# Patient Record
Sex: MALE | Race: WHITE | NOT HISPANIC OR LATINO | Employment: FULL TIME | URBAN - METROPOLITAN AREA
[De-identification: names, ages, dates, MRNs, and addresses within clinical notes are randomized per-mention and may not be internally consistent; named-entity substitution may affect disease eponyms.]

---

## 2017-01-31 RX ORDER — PANTOPRAZOLE SODIUM 40 MG/1
TABLET, DELAYED RELEASE ORAL
Qty: 60 TABLET | Refills: 10 | OUTPATIENT
Start: 2017-01-31

## 2017-03-07 ENCOUNTER — LAB (OUTPATIENT)
Dept: LAB | Facility: HOSPITAL | Age: 62
End: 2017-03-07

## 2017-03-07 ENCOUNTER — TELEPHONE (OUTPATIENT)
Dept: CARDIOLOGY | Facility: CLINIC | Age: 62
End: 2017-03-07

## 2017-03-07 ENCOUNTER — OFFICE VISIT (OUTPATIENT)
Dept: CARDIOLOGY | Facility: CLINIC | Age: 62
End: 2017-03-07

## 2017-03-07 VITALS
HEIGHT: 69 IN | WEIGHT: 195.02 LBS | SYSTOLIC BLOOD PRESSURE: 120 MMHG | HEART RATE: 65 BPM | DIASTOLIC BLOOD PRESSURE: 88 MMHG | BODY MASS INDEX: 28.88 KG/M2

## 2017-03-07 DIAGNOSIS — I25.810 CORONARY ARTERY DISEASE INVOLVING CORONARY BYPASS GRAFT OF NATIVE HEART WITHOUT ANGINA PECTORIS: ICD-10-CM

## 2017-03-07 DIAGNOSIS — E78.49 OTHER HYPERLIPIDEMIA: ICD-10-CM

## 2017-03-07 DIAGNOSIS — R79.89 ABNORMAL LFTS: ICD-10-CM

## 2017-03-07 DIAGNOSIS — E78.49 OTHER HYPERLIPIDEMIA: Primary | ICD-10-CM

## 2017-03-07 LAB
ALBUMIN SERPL-MCNC: 4.5 G/DL (ref 3.5–5.2)
ALBUMIN/GLOB SERPL: 1.6 G/DL
ALP SERPL-CCNC: 73 U/L (ref 39–117)
ALT SERPL W P-5'-P-CCNC: 46 U/L (ref 1–41)
ANION GAP SERPL CALCULATED.3IONS-SCNC: 15.9 MMOL/L
AST SERPL-CCNC: 34 U/L (ref 1–40)
BILIRUB SERPL-MCNC: 0.5 MG/DL (ref 0.1–1.2)
BUN BLD-MCNC: 11 MG/DL (ref 8–23)
BUN/CREAT SERPL: 11.6 (ref 7–25)
CALCIUM SPEC-SCNC: 9.6 MG/DL (ref 8.6–10.5)
CHLORIDE SERPL-SCNC: 101 MMOL/L (ref 98–107)
CHOLEST SERPL-MCNC: 159 MG/DL (ref 0–200)
CO2 SERPL-SCNC: 27.1 MMOL/L (ref 22–29)
CREAT BLD-MCNC: 0.95 MG/DL (ref 0.76–1.27)
GFR SERPL CREATININE-BSD FRML MDRD: 81 ML/MIN/1.73
GGT SERPL-CCNC: 536 U/L (ref 8–61)
GLOBULIN UR ELPH-MCNC: 2.8 GM/DL
GLUCOSE BLD-MCNC: 103 MG/DL (ref 65–99)
HDLC SERPL-MCNC: 57 MG/DL (ref 40–60)
LDLC SERPL CALC-MCNC: 87 MG/DL (ref 0–100)
LDLC/HDLC SERPL: 1.53 {RATIO}
POTASSIUM BLD-SCNC: 4.7 MMOL/L (ref 3.5–5.2)
PROT SERPL-MCNC: 7.3 G/DL (ref 6–8.5)
SODIUM BLD-SCNC: 144 MMOL/L (ref 136–145)
TRIGL SERPL-MCNC: 75 MG/DL (ref 0–150)
VLDLC SERPL-MCNC: 15 MG/DL (ref 5–40)

## 2017-03-07 PROCEDURE — 36415 COLL VENOUS BLD VENIPUNCTURE: CPT | Performed by: INTERNAL MEDICINE

## 2017-03-07 PROCEDURE — 80053 COMPREHEN METABOLIC PANEL: CPT | Performed by: INTERNAL MEDICINE

## 2017-03-07 PROCEDURE — 80061 LIPID PANEL: CPT | Performed by: INTERNAL MEDICINE

## 2017-03-07 PROCEDURE — 82977 ASSAY OF GGT: CPT

## 2017-03-07 PROCEDURE — 93000 ELECTROCARDIOGRAM COMPLETE: CPT | Performed by: INTERNAL MEDICINE

## 2017-03-07 PROCEDURE — 99214 OFFICE O/P EST MOD 30 MIN: CPT | Performed by: INTERNAL MEDICINE

## 2017-03-07 NOTE — TELEPHONE ENCOUNTER
----- Message from Torsten Lwa MD sent at 3/7/2017  3:01 PM EST -----  i called and left a message; nl lfts look good ggt is no change it is high; lipif=ds are better

## 2017-03-07 NOTE — PROGRESS NOTES
Date of Office Visit: 2017  Encounter Provider: Torsten Law MD  Place of Service: [facility] CARDIOLOGY  Patient Name: Khai Slater  :1955  [id number]    Chief Complaint   Patient presents with   • Coronary Artery Disease   :     HPI: Khai Slater is a 62 y.o. male   He is here for a followup of his coronary disease. He has been doing well. He has not had chest pain, shortness of breath.  He works out on the [employer] three times a week.  He does not have PND, orthopnea, edema, syncope, palpitations.           Past Medical History   Diagnosis Date   • Acute non-ST-elevation myocardial infarction 3/4/2016     Description:  tx with Taxus stent to dis circ   • CAD (coronary artery disease)    • Coronary arteriosclerosis in native artery 3/4/2016     Description: due to FAA cath in  restenosis in circ and new lesion in LAD tx with cypher stents;  no sx but due to FAA relooked and CABG LIMA to LAD and SVG to circ by Dr Goode;  recath no sx and LIMA atretic and SVG to circ gone   • GERD (gastroesophageal reflux disease)    • Hyperlipidemia    • Hypertension    • NSTEMI (non-ST elevated myocardial infarction)        Past Surgical History   Procedure Laterality Date   • Coronary artery bypass graft     • Coronary angioplasty with stent placement       2013; 70% stenosis in the LIMA to the LAD, % occluded, proximal LAD has 90% in stent stenosis, 100% distal left circumflex, 20% diffuse disease of the RCA.   • Colonoscopy         Social History     Social History   • Marital status:      Spouse name: N/A   • Number of children: N/A   • Years of education: N/A     Occupational History   • Not on file.     Social History Main Topics   • Smoking status: Never Smoker   • Smokeless tobacco: Not on file      Comment: CAFFEINE USE   • Alcohol use Yes      Comment: occassionally   • Drug use: No   • Sexual activity: Defer     Other Topics Concern  "  • Not on file     Social History Narrative       Family History   Problem Relation Age of Onset   • Heart disease Mother    • Heart attack Mother    • Hypertension Father    • Diabetes Maternal Grandfather        Review of Systems   Constitution: Negative for decreased appetite, fever, malaise/fatigue and weight loss.   HENT: Negative for nosebleeds.    Eyes: Negative for double vision.   Cardiovascular: Negative for chest pain, claudication, cyanosis, dyspnea on exertion, irregular heartbeat, leg swelling, near-syncope, orthopnea, palpitations, paroxysmal nocturnal dyspnea and syncope.   Respiratory: Negative for cough, hemoptysis and shortness of breath.    Hematologic/Lymphatic: Negative for bleeding problem.   Skin: Negative for rash.   Musculoskeletal: Negative for falls and myalgias.   Gastrointestinal: Negative for hematochezia, jaundice, melena, nausea and vomiting.   Genitourinary: Negative for hematuria.   Neurological: Negative for dizziness and seizures.   Psychiatric/Behavioral: Negative for altered mental status and memory loss.       No Known Allergies      Current Outpatient Prescriptions:   •  aspirin 81 MG tablet, Take by mouth., Disp: , Rfl:   •  atorvastatin (LIPITOR) 40 MG tablet, TAKE ONE TABLET BY MOUTH DAILY, Disp: 90 tablet, Rfl: 2  •  carvedilol (COREG) 6.25 MG tablet, TAKE ONE TABLET BY MOUTH TWICE A DAY, Disp: 180 tablet, Rfl: 2  •  clopidogrel (PLAVIX) 75 MG tablet, TAKE ONE TABLET BY MOUTH DAILY, Disp: 90 tablet, Rfl: 2  •  Coenzyme Q10 (CO Q 10 PO), Take by mouth., Disp: , Rfl:   •  lisinopril (PRINIVIL,ZESTRIL) 5 MG tablet, TAKE ONE TABLET BY MOUTH DAILY, Disp: 90 tablet, Rfl: 2  •  pantoprazole (PROTONIX) 40 MG EC tablet, Take by mouth., Disp: , Rfl:      Objective:     Vitals:    03/07/17 1127   BP: 120/88   Pulse: 65   Weight: 195 lb 0.3 oz (88.5 kg)   Height: 69\" (175.3 cm)     Body mass index is 28.8 kg/(m^2).    Physical Exam   Constitutional: He is oriented to person, place, " and time. He appears well-developed and well-nourished.   HENT:   Head: Normocephalic.   Eyes: No scleral icterus.   Neck: No JVD present. No thyromegaly present.   Cardiovascular: Normal rate, regular rhythm and normal heart sounds.  Exam reveals no gallop and no friction rub.    No murmur heard.  Pulmonary/Chest: Effort normal and breath sounds normal. He has no wheezes. He has no rales.   Abdominal: Soft. There is no hepatosplenomegaly. There is no tenderness.   Musculoskeletal: Normal range of motion. He exhibits no edema.   Lymphadenopathy:     He has no cervical adenopathy.   Neurological: He is alert and oriented to person, place, and time.   Skin: Skin is warm and dry. No rash noted.   Psychiatric: He has a normal mood and affect.         ECG 12 Lead  Date/Time: 3/7/2017 12:38 PM  Performed by: JULIANNA POWERS  Authorized by: JULIANNA POWERS   Comparison: compared with previous ECG   Similar to previous ECG  Rhythm: sinus rhythm  Clinical impression: normal ECG             Assessment:       Diagnosis Plan   1. Other hyperlipidemia  Comprehensive Metabolic Panel    Lipid Panel    Gamma GT   2. Abnormal LFTs  Comprehensive Metabolic Panel    Lipid Panel    Gamma GT   3. Coronary artery disease involving coronary bypass graft of native heart without angina pectoris  Comprehensive Metabolic Panel    Lipid Panel    Gamma GT          Plan:        He is doing well. He is asymptomatic.   His story is a little tough. He had a non-STEMI in 2007.  To his distal circ he had a Taxus stent put in.   In 2008, as part of his FAA evaluation it looked like he had a problem.  He had a repeat cath and his circ had restenosed, and they felt he had a new lesion in his LAD.  He had Cypher stents at that time.  In 2012, again he had to have a repeat cath because of his FAA status.  They felt that those had all re-stenosed and so he underwent a bypass.  He had a LIMA to the LAD, and a vein to the circ.  The RCA has always been okay.    In 2013, recathed, no symptoms.   The LIMA they felt was somewhat smallish and may have a lesion at the insertion site, the vein to the circumflex was occluded, the RCA had some 20% disease in it.  We have managed him medically since then.  In 2015 he had a stress test that was normal.  Honestly, when I have looked at his films I feel like that DAMEON to the LAD is probably okay and does not have significant disease in it.  I think that he has a small area of his distal circ that is a problem, but that does not even show up on his stress.  His right coronaries are okay, and his LAD is okay.  He has been fine.  At this point, I am not going to make any changes. I am going to check his liver functions because those have been high, and we have him on statins. I am going to have him come back in and see me in six months.  Next year we will plan on doing a stress test on him.       Coronary Artery Disease  Assessment  • The patient has no angina    Plan  • Lifestyle modifications discussed include adhering to a heart healthy diet, maintenance of a healthy weight, medication compliance, regular exercise and regular monitoring of cholesterol and blood pressure    Subjective - Objective  • There is a history of past MI  • There is a history of previous coronary artery bypass graft  • There has been a previous stent procedure using MC  • Current antiplatelet therapy includes aspirin 81 mg        As always, it has been a pleasure to participate in your patient's care.      Sincerely,       Torsten Law MD

## 2017-04-05 RX ORDER — CLOPIDOGREL BISULFATE 75 MG/1
TABLET ORAL
Qty: 90 TABLET | Refills: 1 | Status: SHIPPED | OUTPATIENT
Start: 2017-04-05 | End: 2017-10-23 | Stop reason: SDUPTHER

## 2017-09-07 ENCOUNTER — LAB (OUTPATIENT)
Dept: LAB | Facility: HOSPITAL | Age: 62
End: 2017-09-07

## 2017-09-07 ENCOUNTER — TELEPHONE (OUTPATIENT)
Dept: CARDIOLOGY | Facility: CLINIC | Age: 62
End: 2017-09-07

## 2017-09-07 ENCOUNTER — OFFICE VISIT (OUTPATIENT)
Dept: CARDIOLOGY | Facility: CLINIC | Age: 62
End: 2017-09-07

## 2017-09-07 VITALS
HEIGHT: 69 IN | OXYGEN SATURATION: 99 % | WEIGHT: 190 LBS | BODY MASS INDEX: 28.14 KG/M2 | SYSTOLIC BLOOD PRESSURE: 120 MMHG | DIASTOLIC BLOOD PRESSURE: 78 MMHG | HEART RATE: 60 BPM

## 2017-09-07 DIAGNOSIS — I25.810 CORONARY ARTERY DISEASE INVOLVING CORONARY BYPASS GRAFT OF NATIVE HEART WITHOUT ANGINA PECTORIS: Primary | ICD-10-CM

## 2017-09-07 DIAGNOSIS — R79.89 ABNORMAL LFTS: ICD-10-CM

## 2017-09-07 LAB
ALBUMIN SERPL-MCNC: 4.5 G/DL (ref 3.5–5.2)
ALBUMIN/GLOB SERPL: 1.6 G/DL
ALP SERPL-CCNC: 74 U/L (ref 39–117)
ALT SERPL W P-5'-P-CCNC: 40 U/L (ref 1–41)
ANION GAP SERPL CALCULATED.3IONS-SCNC: 10.2 MMOL/L
AST SERPL-CCNC: 28 U/L (ref 1–40)
BILIRUB SERPL-MCNC: 0.8 MG/DL (ref 0.1–1.2)
BUN BLD-MCNC: 12 MG/DL (ref 8–23)
BUN/CREAT SERPL: 12.1 (ref 7–25)
CALCIUM SPEC-SCNC: 10 MG/DL (ref 8.6–10.5)
CHLORIDE SERPL-SCNC: 100 MMOL/L (ref 98–107)
CO2 SERPL-SCNC: 28.8 MMOL/L (ref 22–29)
CREAT BLD-MCNC: 0.99 MG/DL (ref 0.76–1.27)
GFR SERPL CREATININE-BSD FRML MDRD: 77 ML/MIN/1.73
GGT SERPL-CCNC: 467 U/L (ref 8–61)
GLOBULIN UR ELPH-MCNC: 2.9 GM/DL
GLUCOSE BLD-MCNC: 105 MG/DL (ref 65–99)
POTASSIUM BLD-SCNC: 5.2 MMOL/L (ref 3.5–5.2)
PROT SERPL-MCNC: 7.4 G/DL (ref 6–8.5)
SODIUM BLD-SCNC: 139 MMOL/L (ref 136–145)

## 2017-09-07 PROCEDURE — 82977 ASSAY OF GGT: CPT

## 2017-09-07 PROCEDURE — 36415 COLL VENOUS BLD VENIPUNCTURE: CPT

## 2017-09-07 PROCEDURE — 80053 COMPREHEN METABOLIC PANEL: CPT

## 2017-09-07 PROCEDURE — 93000 ELECTROCARDIOGRAM COMPLETE: CPT | Performed by: PHYSICIAN ASSISTANT

## 2017-09-07 PROCEDURE — 99214 OFFICE O/P EST MOD 30 MIN: CPT | Performed by: PHYSICIAN ASSISTANT

## 2017-09-07 NOTE — PROGRESS NOTES
Date of Office Visit: 2017  Encounter Provider: DENISSE Livingston  Place of Service: [facility] CARDIOLOGY  Patient Name: Khai Slater  :1955    Chief Complaint   Patient presents with   • Coronary Artery Disease     6 month follow up   :     HPI: Khai Slater is a 62 y.o. male who presents today for Follow-up.  Old records have been obtained and reviewed by me.  He is a patient of Dr. Law's with a past cardiac history significant for coronary artery disease, status post CABG and multiple stent placements.  His last stress test was in 2015, and it was normal.  He has been managed medically for his coronary disease since then.  He was seen in our office by Dr. Law on 3/4/2016.  At that time, he reported that he was feeling well.  He denied any chest pain or shortness of breath, and was exercising often.  Dr. Law noted that recent lab work revealed elevated liver enzymes.  Lab work was rechecked on 2016.  This included a CMP, lipid panel, and gamma GT.  CMP at that time revealed normal liver enzymes.  His gamma GT was elevated at 523.  At the time that these labs were drawn, the patient stated that he had accidentally been off his Lipitor for 1 week.    I first saw him about a year ago and he was doing great.  He had been taking his Lipitor daily.  He then saw Dr. Law on 3/7/2017 and was still doing well.  Dr. Law checked his liver function tests, which showed a stable liver enzymes and a stable but elevated gamma GT.  His lipid panel looks fantastic with an LDL of 87 and HDL of 57.  No changes were made to his medical regimen, and he is here today for 6 month follow-up.  The plan was to check a stress test in 1 year, which would be in 2018.   Over the past 6 months he's been doing well.  He is exercising regularly.  He and his wife went for a bike ride for 14 miles the other day and had no problem whatsoever.  He denies any chest  pain, palpitations, shortness of breath, edema, dizziness, or syncope.  He did the Whole 30 diet and lost 14 pounds and felt great.        Past Medical History:   Diagnosis Date   • Acute non-ST-elevation myocardial infarction 3/4/2016    Description: 7/07 tx with Taxus stent to dis circ   • CAD (coronary artery disease)    • Coronary arteriosclerosis in native artery 3/4/2016    Description: due to FAA cath in 1/08 restenosis in circ and new lesion in LAD tx with cypher stents; 9/12 no sx but due to FAA relooked and CABG LIMA to LAD and SVG to circ by Dr Goode; 5/13 recath no sx and LIMA atretic and SVG to circ gone   • GERD (gastroesophageal reflux disease)    • Hyperlipidemia    • Hypertension    • NSTEMI (non-ST elevated myocardial infarction)        Past Surgical History:   Procedure Laterality Date   • COLONOSCOPY     • CORONARY ANGIOPLASTY WITH STENT PLACEMENT      5/2/2013; 70% stenosis in the LIMA to the LAD, % occluded, proximal LAD has 90% in stent stenosis, 100% distal left circumflex, 20% diffuse disease of the RCA.   • CORONARY ARTERY BYPASS GRAFT         Social History     Social History   • Marital status:      Spouse name: N/A   • Number of children: N/A   • Years of education: N/A     Occupational History   • Not on file.     Social History Main Topics   • Smoking status: Never Smoker   • Smokeless tobacco: Not on file      Comment: CAFFEINE USE   • Alcohol use 0.6 oz/week     1 Cans of beer per week      Comment: occassionally   • Drug use: No   • Sexual activity: Defer     Other Topics Concern   • Not on file     Social History Narrative       Family History   Problem Relation Age of Onset   • Heart disease Mother    • Heart attack Mother    • Hypertension Father    • No Known Problems Maternal Grandmother    • Diabetes Maternal Grandfather    • No Known Problems Paternal Grandmother    • No Known Problems Paternal Grandfather        Review of Systems   Constitution: Negative for  chills, fever, malaise/fatigue, weight gain and weight loss.   HENT: Negative for ear pain, headaches, hearing loss, nosebleeds and sore throat.    Eyes: Negative for double vision, pain and visual disturbance.   Cardiovascular: Negative for chest pain, dyspnea on exertion, irregular heartbeat, leg swelling, near-syncope, orthopnea, palpitations, paroxysmal nocturnal dyspnea and syncope.   Respiratory: Negative for cough, shortness of breath, sleep disturbances due to breathing, snoring and wheezing.    Endocrine: Negative for cold intolerance, heat intolerance and polyuria.   Skin: Negative for itching and rash.   Musculoskeletal: Negative for joint pain, joint swelling and myalgias.   Gastrointestinal: Negative for abdominal pain, diarrhea, melena, nausea and vomiting.   Genitourinary: Negative for frequency, hematuria and hesitancy.   Neurological: Negative for excessive daytime sleepiness, light-headedness, numbness, paresthesias and seizures.   Psychiatric/Behavioral: Negative for altered mental status and depression.   Allergic/Immunologic: Negative.    All other systems reviewed and are negative.      No Known Allergies      Current Outpatient Prescriptions:   •  aspirin 81 MG tablet, Take 81 mg by mouth Daily., Disp: , Rfl:   •  atorvastatin (LIPITOR) 40 MG tablet, TAKE ONE TABLET BY MOUTH DAILY, Disp: 90 tablet, Rfl: 2  •  carvedilol (COREG) 6.25 MG tablet, TAKE ONE TABLET BY MOUTH TWICE A DAY, Disp: 180 tablet, Rfl: 2  •  clopidogrel (PLAVIX) 75 MG tablet, TAKE ONE TABLET BY MOUTH DAILY, Disp: 90 tablet, Rfl: 1  •  Coenzyme Q10 (CO Q 10 PO), Take 1 tablet by mouth Daily., Disp: , Rfl:   •  lisinopril (PRINIVIL,ZESTRIL) 5 MG tablet, TAKE ONE TABLET BY MOUTH DAILY, Disp: 90 tablet, Rfl: 2  •  pantoprazole (PROTONIX) 40 MG EC tablet, Take 40 mg by mouth Daily., Disp: , Rfl:      Objective:     Vitals:    09/07/17 1101 09/07/17 1117   BP: 118/76 120/78   BP Location: Right arm Left arm   Pulse: 60    SpO2: 99%  "   Weight: 190 lb (86.2 kg)    Height: 69\" (175.3 cm)      Body mass index is 28.06 kg/(m^2).    PHYSICAL EXAM:    Physical Exam   Constitutional: He is oriented to person, place, and time. He appears well-developed and well-nourished. No distress.   HENT:   Head: Normocephalic and atraumatic.   Eyes: Pupils are equal, round, and reactive to light.   Neck: No JVD present. No thyromegaly present.   Cardiovascular: Normal rate, regular rhythm, normal heart sounds and intact distal pulses.    No murmur heard.  Pulmonary/Chest: Effort normal and breath sounds normal. No respiratory distress.   Abdominal: Soft. Bowel sounds are normal. He exhibits no distension. There is no splenomegaly or hepatomegaly. There is no tenderness.   Musculoskeletal: Normal range of motion. He exhibits no edema.   Neurological: He is alert and oriented to person, place, and time.   Skin: Skin is warm and dry. He is not diaphoretic. No erythema.   Psychiatric: He has a normal mood and affect. His behavior is normal. Judgment normal.         ECG 12 Lead  Date/Time: 9/7/2017 11:24 AM  Performed by: MIKKI MARCH.  Authorized by: MIKKI MARCH.   Comparison: compared with previous ECG from 3/7/2017  Similar to previous ECG  Rhythm: sinus rhythm  BPM: 60  T depression: III  Q waves: III and aVF  Clinical impression: abnormal ECG  Comments: Indication: Coronary artery disease, status post CABG and stent placement.              Assessment:       Diagnosis Plan   1. Coronary artery disease involving coronary bypass graft of native heart without angina pectoris  Stress Test With Myocardial Perfusion One Day    ECG 12 Lead   2. Abnormal LFTs  Comprehensive Metabolic Panel    Gamma GT     Orders Placed This Encounter   Procedures   • Comprehensive Metabolic Panel     Standing Status:   Future     Standing Expiration Date:   9/7/2018   • Gamma GT     Standing Status:   Future     Standing Expiration Date:   9/7/2018   • Stress Test With Myocardial " Perfusion One Day     Same day as his appt. With Thanh in 3/2018     Standing Status:   Future     Standing Expiration Date:   9/7/2018     Order Specific Question:   Rest/stress, rest only or stress only?     Answer:   Rest/Stress     Order Specific Question:   What stress agent will be used?     Answer:   Exercise with possible pharmacologic     Order Specific Question:   Reason for exam?     Answer:   Known Coronary Artery Disease   • ECG 12 Lead     This order was created via procedure documentation          Plan:       1.  Coronary Artery Disease  Assessment  • The patient has no angina    Plan  • Lifestyle modifications discussed include adhering to a heart healthy diet, maintenance of a healthy weight, medication compliance, regular exercise and regular monitoring of cholesterol and blood pressure    Subjective - Objective  • There is a history of past MI  • There is a history of previous coronary artery bypass graft  • There has been a previous stent procedure using MC  • Current antiplatelet therapy includes aspirin 81 mg and clopidogrel 75 mg  • Overall he's doing great.  He is very active without any difficulty whatsoever.  He had his wife would like to take a trip to [place], and I think this is a great idea.  I just encouraged him to make sure that he takes his medications with him and that he stands up and walks on the plain every now and then to prevent blood clots in his legs.  Per Dr. Law's recommendations, we will check a nuclear stress test in 6 months when he comes back to see Dr. Law.    2.  Elevated LFTs.  I'm going to check a CMP and a Gamma GT today.  These will both be nonfasting.    He will follow-up with Dr. Law in 6 months with a stress test the same day.    As always, it has been a pleasure to participate in your patient's care.      Sincerely,         Kanika Herrera PA-C

## 2017-09-07 NOTE — TELEPHONE ENCOUNTER
I called the patient and informed him of his normal liver function test and a lower gamma GT.  Continue current medical regimen.

## 2017-10-04 RX ORDER — LISINOPRIL 5 MG/1
5 TABLET ORAL DAILY
Qty: 90 TABLET | Refills: 2 | Status: SHIPPED | OUTPATIENT
Start: 2017-10-04 | End: 2018-06-20 | Stop reason: SDUPTHER

## 2017-10-23 RX ORDER — CLOPIDOGREL BISULFATE 75 MG/1
TABLET ORAL
Qty: 90 TABLET | Refills: 3 | Status: SHIPPED | OUTPATIENT
Start: 2017-10-23 | End: 2018-06-20 | Stop reason: SDUPTHER

## 2018-01-08 RX ORDER — ATORVASTATIN CALCIUM 40 MG/1
TABLET, FILM COATED ORAL
Qty: 90 TABLET | Refills: 3 | Status: SHIPPED | OUTPATIENT
Start: 2018-01-08 | End: 2018-04-05

## 2018-02-13 RX ORDER — CARVEDILOL 6.25 MG/1
TABLET ORAL
Qty: 180 TABLET | Refills: 3 | Status: SHIPPED | OUTPATIENT
Start: 2018-02-13 | End: 2018-06-20 | Stop reason: SDUPTHER

## 2018-04-05 ENCOUNTER — LAB (OUTPATIENT)
Dept: LAB | Facility: HOSPITAL | Age: 63
End: 2018-04-05

## 2018-04-05 ENCOUNTER — HOSPITAL ENCOUNTER (OUTPATIENT)
Dept: CARDIOLOGY | Facility: HOSPITAL | Age: 63
Discharge: HOME | End: 2018-04-05
Admitting: PHYSICIAN ASSISTANT

## 2018-04-05 ENCOUNTER — OFFICE VISIT (OUTPATIENT)
Dept: CARDIOLOGY | Facility: CLINIC | Age: 63
End: 2018-04-05

## 2018-04-05 VITALS
HEART RATE: 72 BPM | DIASTOLIC BLOOD PRESSURE: 72 MMHG | BODY MASS INDEX: 29.03 KG/M2 | WEIGHT: 196 LBS | OXYGEN SATURATION: 98 % | HEIGHT: 69 IN | SYSTOLIC BLOOD PRESSURE: 112 MMHG

## 2018-04-05 DIAGNOSIS — I25.810 CORONARY ARTERY DISEASE INVOLVING CORONARY BYPASS GRAFT OF NATIVE HEART WITHOUT ANGINA PECTORIS: ICD-10-CM

## 2018-04-05 DIAGNOSIS — R79.89 ABNORMAL LFTS: ICD-10-CM

## 2018-04-05 DIAGNOSIS — R79.89 ABNORMAL LFTS: Primary | ICD-10-CM

## 2018-04-05 LAB
ALBUMIN SERPL-MCNC: 4.4 G/DL (ref 3.5–5.2)
ALBUMIN/GLOB SERPL: 1.8 G/DL
ALP SERPL-CCNC: 87 U/L (ref 39–117)
ALT SERPL W P-5'-P-CCNC: 59 U/L (ref 1–41)
ANION GAP SERPL CALCULATED.3IONS-SCNC: 10.1 MMOL/L
AST SERPL-CCNC: 30 U/L (ref 1–40)
BH CV NUCLEAR PRIOR STUDY: 3
BH CV STRESS BP STAGE 1: NORMAL
BH CV STRESS BP STAGE 2: NORMAL
BH CV STRESS BP STAGE 3: NORMAL
BH CV STRESS BP STAGE 4: NORMAL
BH CV STRESS DURATION MIN STAGE 1: 3
BH CV STRESS DURATION MIN STAGE 2: 3
BH CV STRESS DURATION MIN STAGE 3: 3
BH CV STRESS DURATION MIN STAGE 4: 3
BH CV STRESS DURATION SEC STAGE 1: 0
BH CV STRESS DURATION SEC STAGE 2: 0
BH CV STRESS DURATION SEC STAGE 3: 0
BH CV STRESS DURATION SEC STAGE 4: 0
BH CV STRESS GRADE STAGE 1: 10
BH CV STRESS GRADE STAGE 2: 12
BH CV STRESS GRADE STAGE 3: 14
BH CV STRESS GRADE STAGE 4: 16
BH CV STRESS HR STAGE 1: 87
BH CV STRESS HR STAGE 2: 90
BH CV STRESS HR STAGE 3: 116
BH CV STRESS HR STAGE 4: 160
BH CV STRESS METS STAGE 1: 5
BH CV STRESS METS STAGE 2: 7.5
BH CV STRESS METS STAGE 3: 10
BH CV STRESS METS STAGE 4: 13.5
BH CV STRESS PROTOCOL 1: NORMAL
BH CV STRESS RECOVERY BP: NORMAL MMHG
BH CV STRESS RECOVERY HR: 94 BPM
BH CV STRESS SPEED STAGE 1: 1.7
BH CV STRESS SPEED STAGE 2: 2.5
BH CV STRESS SPEED STAGE 3: 3.4
BH CV STRESS SPEED STAGE 4: 4.2
BH CV STRESS STAGE 1: 1
BH CV STRESS STAGE 2: 2
BH CV STRESS STAGE 3: 3
BH CV STRESS STAGE 4: 4
BILIRUB SERPL-MCNC: 0.6 MG/DL (ref 0.1–1.2)
BUN BLD-MCNC: 12 MG/DL (ref 8–23)
BUN/CREAT SERPL: 11.8 (ref 7–25)
CALCIUM SPEC-SCNC: 9.5 MG/DL (ref 8.6–10.5)
CHLORIDE SERPL-SCNC: 101 MMOL/L (ref 98–107)
CHOLEST SERPL-MCNC: 142 MG/DL (ref 0–200)
CO2 SERPL-SCNC: 29.9 MMOL/L (ref 22–29)
CREAT BLD-MCNC: 1.02 MG/DL (ref 0.76–1.27)
GFR SERPL CREATININE-BSD FRML MDRD: 74 ML/MIN/1.73
GGT SERPL-CCNC: 487 U/L (ref 8–61)
GLOBULIN UR ELPH-MCNC: 2.4 GM/DL
GLUCOSE BLD-MCNC: 139 MG/DL (ref 65–99)
HDLC SERPL-MCNC: 52 MG/DL (ref 40–60)
LDLC SERPL CALC-MCNC: 68 MG/DL (ref 0–100)
LDLC/HDLC SERPL: 1.3 {RATIO}
LV EF NUC BP: 62 %
MAXIMAL PREDICTED HEART RATE: 158 BPM
PERCENT MAX PREDICTED HR: 101.27 %
POTASSIUM BLD-SCNC: 4.5 MMOL/L (ref 3.5–5.2)
PROT SERPL-MCNC: 6.8 G/DL (ref 6–8.5)
SODIUM BLD-SCNC: 141 MMOL/L (ref 136–145)
STRESS BASELINE BP: NORMAL MMHG
STRESS BASELINE HR: 63 BPM
STRESS PERCENT HR: 119 %
STRESS POST ESTIMATED WORKLOAD: 13.5 METS
STRESS POST EXERCISE DUR MIN: 12 MIN
STRESS POST EXERCISE DUR SEC: 0 SEC
STRESS POST PEAK BP: NORMAL MMHG
STRESS POST PEAK HR: 160 BPM
STRESS TARGET HR: 134 BPM
TRIGL SERPL-MCNC: 111 MG/DL (ref 0–150)
VLDLC SERPL-MCNC: 22.2 MG/DL (ref 5–40)

## 2018-04-05 PROCEDURE — 25010000002 REGADENOSON 0.4 MG/5ML SOLUTION: Performed by: PHYSICIAN ASSISTANT

## 2018-04-05 PROCEDURE — 80053 COMPREHEN METABOLIC PANEL: CPT

## 2018-04-05 PROCEDURE — A9502 TC99M TETROFOSMIN: HCPCS | Performed by: PHYSICIAN ASSISTANT

## 2018-04-05 PROCEDURE — 78452 HT MUSCLE IMAGE SPECT MULT: CPT

## 2018-04-05 PROCEDURE — 36415 COLL VENOUS BLD VENIPUNCTURE: CPT

## 2018-04-05 PROCEDURE — 80061 LIPID PANEL: CPT

## 2018-04-05 PROCEDURE — 93018 CV STRESS TEST I&R ONLY: CPT | Performed by: INTERNAL MEDICINE

## 2018-04-05 PROCEDURE — 0 TECHNETIUM TETROFOSMIN KIT: Performed by: PHYSICIAN ASSISTANT

## 2018-04-05 PROCEDURE — 93000 ELECTROCARDIOGRAM COMPLETE: CPT | Performed by: PHYSICIAN ASSISTANT

## 2018-04-05 PROCEDURE — 78452 HT MUSCLE IMAGE SPECT MULT: CPT | Performed by: INTERNAL MEDICINE

## 2018-04-05 PROCEDURE — 93016 CV STRESS TEST SUPVJ ONLY: CPT | Performed by: INTERNAL MEDICINE

## 2018-04-05 PROCEDURE — 93017 CV STRESS TEST TRACING ONLY: CPT

## 2018-04-05 PROCEDURE — 99213 OFFICE O/P EST LOW 20 MIN: CPT | Performed by: PHYSICIAN ASSISTANT

## 2018-04-05 PROCEDURE — 82977 ASSAY OF GGT: CPT

## 2018-04-05 RX ADMIN — TETROFOSMIN 1 DOSE: 1.38 INJECTION, POWDER, LYOPHILIZED, FOR SOLUTION INTRAVENOUS at 13:10

## 2018-04-05 RX ADMIN — TETROFOSMIN 1 DOSE: 1.38 INJECTION, POWDER, LYOPHILIZED, FOR SOLUTION INTRAVENOUS at 11:45

## 2018-04-05 RX ADMIN — REGADENOSON 0.4 MG: 0.08 INJECTION, SOLUTION INTRAVENOUS at 13:10

## 2018-04-05 NOTE — PROGRESS NOTES
Date of Office Visit: 2018  Encounter Provider: DENISSE Livingston  Place of Service: [facility] CARDIOLOGY  Patient Name: Khai Slater  :1955    Chief Complaint   Patient presents with   • Coronary Artery Disease     6 month follow up   :     HPI: Khai Slater is a 63 y.o. male who presents today for follow-up.  Old records have been obtained and reviewed by me.  He is a patient of Dr. Law's with a past cardiac history significant for coronary artery disease, status post CABG and multiple stents.  He has had chronic and stable elevated gamma GT.  He has tolerated his Lipitor and his lipid panel has been well-controlled.  He was last in our office to see me on 2017.  At that visit he was doing well.  He did the Whole 30 diet and lost 14 pounds.   Since he was last in our office he's been doing great.  He exercises by using the elliptical machine and walking.  He denies any chest pain, shortness of breath, palpitations, edema, dizziness, or syncope.  He was fasting today until he ate peanut butter crackers after his stress test.  After the Whole 30 diet he went back to eating his normal diet and gained all the weight back.      Past Medical History:   Diagnosis Date   • Acute non-ST-elevation myocardial infarction 3/4/2016    Description:  tx with Taxus stent to dis circ   • CAD (coronary artery disease)    • Coronary arteriosclerosis in native artery 3/4/2016    Description: due to FAA cath in  restenosis in circ and new lesion in LAD tx with cypher stents;  no sx but due to FAA relooked and CABG LIMA to LAD and SVG to circ by Dr Goode;  recath no sx and LIMA atretic and SVG to circ gone   • GERD (gastroesophageal reflux disease)    • Hyperlipidemia    • Hypertension    • NSTEMI (non-ST elevated myocardial infarction)        Past Surgical History:   Procedure Laterality Date   • CARDIAC CATHETERIZATION     • COLONOSCOPY     • CORONARY  ANGIOPLASTY WITH STENT PLACEMENT      5/2/2013; 70% stenosis in the LIMA to the LAD, % occluded, proximal LAD has 90% in stent stenosis, 100% distal left circumflex, 20% diffuse disease of the RCA.   • CORONARY ARTERY BYPASS GRAFT         Social History     Social History   • Marital status:      Spouse name: N/A   • Number of children: N/A   • Years of education: N/A     Occupational History   • Not on file.     Social History Main Topics   • Smoking status: Never Smoker   • Smokeless tobacco: Never Used      Comment: CAFFEINE USE   • Alcohol use 0.6 oz/week     1 Cans of beer per week      Comment: occassionally   • Drug use: No   • Sexual activity: Defer     Other Topics Concern   • Not on file     Social History Narrative   • No narrative on file       Family History   Problem Relation Age of Onset   • Heart disease Mother    • Heart attack Mother    • Hypertension Father    • No Known Problems Maternal Grandmother    • Diabetes Maternal Grandfather    • No Known Problems Paternal Grandmother    • No Known Problems Paternal Grandfather        Review of Systems   Constitution: Negative for chills, fever and malaise/fatigue.   Cardiovascular: Negative for chest pain, dyspnea on exertion, leg swelling, near-syncope, orthopnea, palpitations, paroxysmal nocturnal dyspnea and syncope.   Respiratory: Negative for cough and shortness of breath.    Musculoskeletal: Negative for joint pain, joint swelling and myalgias.   Gastrointestinal: Negative for abdominal pain, diarrhea, melena, nausea and vomiting.   Genitourinary: Negative for frequency and hematuria.   Neurological: Negative for light-headedness, numbness, paresthesias and seizures.   Allergic/Immunologic: Negative.    All other systems reviewed and are negative.      No Known Allergies      Current Outpatient Prescriptions:   •  aspirin 81 MG tablet, Take 81 mg by mouth Daily., Disp: , Rfl:   •  atorvastatin (LIPITOR) 40 MG tablet, TAKE ONE TABLET  "BY MOUTH DAILY, Disp: 90 tablet, Rfl: 2  •  carvedilol (COREG) 6.25 MG tablet, TAKE ONE TABLET BY MOUTH TWICE A DAY, Disp: 180 tablet, Rfl: 3  •  clopidogrel (PLAVIX) 75 MG tablet, TAKE ONE TABLET BY MOUTH DAILY, Disp: 90 tablet, Rfl: 3  •  Coenzyme Q10 (CO Q 10 PO), Take 1 tablet by mouth Daily., Disp: , Rfl:   •  lisinopril (PRINIVIL,ZESTRIL) 5 MG tablet, Take 1 tablet by mouth Daily., Disp: 90 tablet, Rfl: 2  •  pantoprazole (PROTONIX) 40 MG EC tablet, Take 40 mg by mouth Daily., Disp: , Rfl:   No current facility-administered medications for this visit.       Objective:     Vitals:    04/05/18 1356 04/05/18 1405   BP: 120/70 112/72   BP Location: Right arm Left arm   Pulse: 72    SpO2: 98%    Weight: 88.9 kg (196 lb)    Height: 175.3 cm (69\")      Body mass index is 28.94 kg/m².    PHYSICAL EXAM:    Physical Exam   Constitutional: He is oriented to person, place, and time. He appears well-developed and well-nourished. No distress.   HENT:   Head: Normocephalic and atraumatic.   Eyes: Pupils are equal, round, and reactive to light.   Neck: No JVD present. No thyromegaly present.   Cardiovascular: Normal rate, regular rhythm, normal heart sounds and intact distal pulses.    No murmur heard.  Pulmonary/Chest: Effort normal and breath sounds normal. No respiratory distress.   Abdominal: Soft. Bowel sounds are normal. He exhibits no distension. There is no splenomegaly or hepatomegaly. There is no tenderness.   Musculoskeletal: Normal range of motion. He exhibits no edema.   Neurological: He is alert and oriented to person, place, and time.   Skin: Skin is warm and dry. He is not diaphoretic. No erythema.   Psychiatric: He has a normal mood and affect. His behavior is normal. Judgment normal.         ECG 12 Lead  Date/Time: 4/5/2018 2:14 PM  Performed by: MIKKI MARCH.  Authorized by: MIKKI MARCH   Comparison: compared with previous ECG from 9/7/2017  Similar to previous ECG  Rhythm: sinus rhythm  BPM: " 73  Conduction: non-specific intraventricular conduction delay  Clinical impression: abnormal ECG  Comments: Indication: Coronary artery disease,              Assessment:       Diagnosis Plan   1. Abnormal LFTs  Lipid Panel    Gamma GT    Comprehensive Metabolic Panel   2. Coronary artery disease involving coronary bypass graft of native heart without angina pectoris  Lipid Panel    Gamma GT    ECG 12 Lead     Orders Placed This Encounter   Procedures   • Lipid Panel     Standing Status:   Future     Standing Expiration Date:   4/5/2019   • Gamma GT     Standing Status:   Future     Standing Expiration Date:   4/5/2019   • Comprehensive Metabolic Panel     Standing Status:   Future     Standing Expiration Date:   4/5/2019   • ECG 12 Lead     This order was created via procedure documentation          Plan:       1.  Coronary Artery Disease  Assessment  • The patient has no angina    Plan  • Lifestyle modifications discussed include adhering to a heart healthy diet, maintenance of a healthy weight and regular exercise    Subjective - Objective  • There is a history of previous coronary artery bypass graft  • There has been a previous stent procedure using MC  • Current antiplatelet therapy includes aspirin 81 mg and clopidogrel 75 mg  • Overall he's stable and doing well.  He has no complete of angina or heart failure.  I'm awaiting the results of his nuclear stress test from today.  He is planning on taking a trip to [place] the first 2 weeks in May.  I think this is fantastic.  I will call him when I get the results of the stress test, and we'll go ahead and check a lipid panel and a CMP today as well as a gamma GT.  He will follow-up with Dr. Law in 1 year or sooner if needed.      As always, it has been a pleasure to participate in your patient's care.      Sincerely,         Kanika Herrera PA-C

## 2018-04-24 RX ORDER — NITROGLYCERIN 0.4 MG/1
TABLET SUBLINGUAL
Qty: 30 TABLET | Refills: 3 | Status: SHIPPED | OUTPATIENT
Start: 2018-04-24 | End: 2019-10-01 | Stop reason: SDUPTHER

## 2018-06-20 RX ORDER — ATORVASTATIN CALCIUM 40 MG/1
40 TABLET, FILM COATED ORAL DAILY
Qty: 90 TABLET | Refills: 2 | Status: SHIPPED | OUTPATIENT
Start: 2018-06-20 | End: 2020-02-12

## 2018-06-20 RX ORDER — CARVEDILOL 6.25 MG/1
6.25 TABLET ORAL 2 TIMES DAILY
Qty: 180 TABLET | Refills: 3 | Status: SHIPPED | OUTPATIENT
Start: 2018-06-20 | End: 2019-09-20 | Stop reason: SDUPTHER

## 2018-06-20 RX ORDER — CLOPIDOGREL BISULFATE 75 MG/1
75 TABLET ORAL DAILY
Qty: 90 TABLET | Refills: 3 | Status: SHIPPED | OUTPATIENT
Start: 2018-06-20 | End: 2019-08-13 | Stop reason: SDUPTHER

## 2018-06-20 RX ORDER — LISINOPRIL 5 MG/1
5 TABLET ORAL DAILY
Qty: 90 TABLET | Refills: 2 | Status: SHIPPED | OUTPATIENT
Start: 2018-06-20 | End: 2019-05-06 | Stop reason: SDUPTHER

## 2018-08-29 ENCOUNTER — OFFICE VISIT (OUTPATIENT)
Dept: FAMILY MEDICINE CLINIC | Facility: CLINIC | Age: 63
End: 2018-08-29

## 2018-08-29 VITALS
HEART RATE: 73 BPM | WEIGHT: 197 LBS | SYSTOLIC BLOOD PRESSURE: 118 MMHG | HEIGHT: 69 IN | DIASTOLIC BLOOD PRESSURE: 74 MMHG | OXYGEN SATURATION: 95 % | BODY MASS INDEX: 29.18 KG/M2

## 2018-08-29 DIAGNOSIS — I25.810 CORONARY ARTERY DISEASE INVOLVING CORONARY BYPASS GRAFT OF NATIVE HEART WITHOUT ANGINA PECTORIS: ICD-10-CM

## 2018-08-29 DIAGNOSIS — I10 HYPERTENSION, UNSPECIFIED TYPE: Primary | ICD-10-CM

## 2018-08-29 DIAGNOSIS — E78.49 OTHER HYPERLIPIDEMIA: ICD-10-CM

## 2018-08-29 DIAGNOSIS — R73.09 ELEVATED GLUCOSE: ICD-10-CM

## 2018-08-29 PROCEDURE — 99212 OFFICE O/P EST SF 10 MIN: CPT | Performed by: NURSE PRACTITIONER

## 2018-08-29 NOTE — PROGRESS NOTES
Subjective   Khai Slater is a 63 y.o. male.     Pleasant patient here today history coronary artery disease   Needs new PCP  No acute problems  Up-to-date with his cardiologist   Does not require nitroglycerin for chest pain  Sees urology  History of testicular cancer many years ago  Dr pinzon  Full body Ct   2007  MI stents Mandaen  Bypass 2012 double bypass    cspine fussion  Test cancer 2001  Left oorchectomy    cscope x 3  Last       Mild chronic elevation lft  Liver biopsy  Late 1990s  Thinks maybe   Last workup [facility] approximately 10 years ago  Have been stable    Social history no alcohol abuse no tobacco abuse  C scope up-to-date  Retired   Previous  experience then [employer]  Disability after heart attack  Works for financial planning disability life insurance            Hypertension   Pertinent negatives include no chest pain, palpitations or shortness of breath.        The following portions of the patient's history were reviewed and updated as appropriate: allergies, current medications, past family history, past social history, past surgical history and problem list.    Review of Systems   Constitutional: Negative for fatigue and fever.   HENT: Negative.  Negative for trouble swallowing.    Eyes: Negative.    Respiratory: Negative.  Negative for cough and shortness of breath.    Cardiovascular: Negative for chest pain, palpitations and leg swelling.   Gastrointestinal: Negative.  Negative for abdominal pain.   Genitourinary: Negative.    Musculoskeletal: Negative.    Skin: Negative.    Neurological: Negative.  Negative for dizziness and confusion.   Psychiatric/Behavioral: Negative.        Objective   Physical Exam   Constitutional: He is oriented to person, place, and time. He appears well-developed and well-nourished. No distress.   HENT:   Head: Normocephalic and atraumatic.   Nose: Nose normal.   Mouth/Throat: Oropharynx is clear and moist.   Eyes: Pupils are  equal, round, and reactive to light. Conjunctivae are normal. No scleral icterus.   Neck: Neck supple. No JVD present. No thyromegaly present.   Cardiovascular: Normal rate, regular rhythm and normal heart sounds.  Exam reveals no gallop and no friction rub.    No murmur heard.  Carotids clear   Pulmonary/Chest: Effort normal and breath sounds normal. No respiratory distress. He has no wheezes. He has no rales.   Abdominal: Soft. Bowel sounds are normal. He exhibits no distension and no mass. There is no tenderness. There is no guarding. No hernia.   Musculoskeletal: He exhibits no edema or tenderness.   Lymphadenopathy:     He has no cervical adenopathy.   Neurological: He is alert and oriented to person, place, and time. He has normal reflexes.   Skin: Skin is warm and dry. No rash noted. He is not diaphoretic. No erythema.   Psychiatric: He has a normal mood and affect. His behavior is normal. Judgment and thought content normal.   Vitals reviewed.        Assessment/Plan   Khai was seen today for hypertension.    Diagnoses and all orders for this visit:    Hypertension, unspecified type  -     Comprehensive Metabolic Panel; Future  -     TSH Rfx On Abnormal To Free T4; Future  -     Hemoglobin A1c; Future  -     Lipid Panel With LDL / HDL Ratio; Future    Elevated glucose  -     Comprehensive Metabolic Panel; Future  -     TSH Rfx On Abnormal To Free T4; Future  -     Hemoglobin A1c; Future  -     Lipid Panel With LDL / HDL Ratio; Future    Other hyperlipidemia  -     Comprehensive Metabolic Panel; Future  -     TSH Rfx On Abnormal To Free T4; Future  -     Hemoglobin A1c; Future  -     Lipid Panel With LDL / HDL Ratio; Future    Coronary artery disease involving coronary bypass graft of native heart without angina pectoris  -     Comprehensive Metabolic Panel; Future  -     TSH Rfx On Abnormal To Free T4; Future  -     Hemoglobin A1c; Future  -     Lipid Panel With LDL / HDL Ratio; Future                   Continue therapeutic lifestyle changes  Will reevaluate glucose he's had some impaired glucose apparently  Fasting labs  Follow-up yearly physical  Consider vascular screenings.  Gave telephone number to schedule

## 2018-09-12 ENCOUNTER — RESULTS ENCOUNTER (OUTPATIENT)
Dept: FAMILY MEDICINE CLINIC | Facility: CLINIC | Age: 63
End: 2018-09-12

## 2018-09-12 DIAGNOSIS — I25.810 CORONARY ARTERY DISEASE INVOLVING CORONARY BYPASS GRAFT OF NATIVE HEART WITHOUT ANGINA PECTORIS: ICD-10-CM

## 2018-09-12 DIAGNOSIS — I10 HYPERTENSION, UNSPECIFIED TYPE: ICD-10-CM

## 2018-09-12 DIAGNOSIS — R73.09 ELEVATED GLUCOSE: ICD-10-CM

## 2018-09-12 DIAGNOSIS — E78.49 OTHER HYPERLIPIDEMIA: ICD-10-CM

## 2018-10-11 ENCOUNTER — TELEPHONE (OUTPATIENT)
Dept: FAMILY MEDICINE CLINIC | Facility: CLINIC | Age: 63
End: 2018-10-11

## 2018-10-11 NOTE — TELEPHONE ENCOUNTER
There are lab orders that were put in last month does pt still need these?  Thank you  This is in overdue tasks

## 2019-05-06 RX ORDER — LISINOPRIL 5 MG/1
TABLET ORAL
Qty: 90 TABLET | Refills: 2 | Status: SHIPPED | OUTPATIENT
Start: 2019-05-06 | End: 2021-04-14 | Stop reason: SDUPTHER

## 2019-08-13 RX ORDER — CLOPIDOGREL BISULFATE 75 MG/1
TABLET ORAL
Qty: 90 TABLET | Refills: 3 | Status: SHIPPED | OUTPATIENT
Start: 2019-08-13 | End: 2019-11-22 | Stop reason: SDUPTHER

## 2019-09-06 ENCOUNTER — TELEPHONE (OUTPATIENT)
Dept: URGENT CARE | Facility: CLINIC | Age: 64
End: 2019-09-06

## 2019-09-06 DIAGNOSIS — L23.7 POISON IVY DERMATITIS: Primary | ICD-10-CM

## 2019-09-06 RX ORDER — METHYLPREDNISOLONE 4 MG/1
TABLET ORAL
Qty: 1 EACH | Refills: 0 | Status: SHIPPED | OUTPATIENT
Start: 2019-09-06 | End: 2019-10-01

## 2019-09-06 NOTE — TELEPHONE ENCOUNTER
D/w pt at front counter.  Sx persist; no new c/o; h/o same; responds well to/requests medrol dose pack.  P - medrol x 1; recheck prn.

## 2019-09-20 RX ORDER — CARVEDILOL 6.25 MG/1
TABLET ORAL
Qty: 180 TABLET | Refills: 4 | Status: SHIPPED | OUTPATIENT
Start: 2019-09-20 | End: 2021-04-12

## 2019-09-27 ENCOUNTER — APPOINTMENT (OUTPATIENT)
Dept: LAB | Facility: HOSPITAL | Age: 64
End: 2019-09-27

## 2019-09-27 ENCOUNTER — OFFICE VISIT (OUTPATIENT)
Dept: CARDIOLOGY | Facility: CLINIC | Age: 64
End: 2019-09-27

## 2019-09-27 VITALS
HEART RATE: 47 BPM | SYSTOLIC BLOOD PRESSURE: 120 MMHG | DIASTOLIC BLOOD PRESSURE: 88 MMHG | WEIGHT: 182.8 LBS | BODY MASS INDEX: 27.08 KG/M2 | HEIGHT: 69 IN

## 2019-09-27 DIAGNOSIS — E78.49 OTHER HYPERLIPIDEMIA: ICD-10-CM

## 2019-09-27 DIAGNOSIS — I25.810 CORONARY ARTERY DISEASE INVOLVING CORONARY BYPASS GRAFT OF NATIVE HEART WITHOUT ANGINA PECTORIS: Primary | ICD-10-CM

## 2019-09-27 DIAGNOSIS — I10 ESSENTIAL HYPERTENSION: ICD-10-CM

## 2019-09-27 PROCEDURE — 93000 ELECTROCARDIOGRAM COMPLETE: CPT | Performed by: INTERNAL MEDICINE

## 2019-09-27 PROCEDURE — 99214 OFFICE O/P EST MOD 30 MIN: CPT | Performed by: INTERNAL MEDICINE

## 2019-09-27 NOTE — PROGRESS NOTES
Date of Office Visit: 2019  Encounter Provider: Torsten Law MD  Place of Service: [facility] CARDIOLOGY  Patient Name: Khai Slater  :1955  [id number]    Chief Complaint   Patient presents with   • Coronary Artery Disease   :     HPI: Khai lSater is a 64 y.o. male  His story is a little tough. He had a non-STEMI in .  To his distal circ he had a Taxus stent put in.   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  He had a repeat cath and his circ had restenosed, and they felt he had a new lesion in his LAD.  He had Cypher stents at that time.  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  They felt that those had all re-stenosed and so he underwent a bypass.  He had a LIMA to the LAD, and a vein to the circ.  The RCA has always been okay.   In , recathed, no symptoms.   The LIMA they felt was somewhat smallish and may have a lesion at the insertion site, the vein to the circumflex was occluded, the RCA had some 20% disease in it.  We have managed him medically since then.  In  he had a stress test that was normal.  Honestly, when I have looked at his films I feel like that DAMEON to the LAD is probably okay and does not have significant disease in it.  I think that he has a small area of his distal circ that is a problem, but that does not even show up on his stress.  His right coronaries are okay, and his LAD is okay.     He is here for follow-up and is been doing pretty well his weights down a little bit he has not been exercising much his grandkids are living with him he is also been out of his Lipitor but has been very faithful in his other medicines he is not having chest pain shortness of breath PND orthopnea edema syncope palpitations    Past Medical History:   Diagnosis Date   • Acute non-ST-elevation myocardial infarction (CMS/HCC) 3/4/2016    Description:  tx with Taxus stent to dis circ    • CAD (coronary artery disease)    • Coronary arteriosclerosis in native artery 3/4/2016    Description: due to FAA cath in 1/08 restenosis in circ and new lesion in LAD tx with cypher stents; 9/12 no sx but due to FAA relooked and CABG LIMA to LAD and SVG to circ by Dr Goode; 5/13 recath no sx and LIMA atretic and SVG to circ gone   • GERD (gastroesophageal reflux disease)    • Hyperlipidemia    • Hypertension    • NSTEMI (non-ST elevated myocardial infarction) (CMS/[facility])        Past Surgical History:   Procedure Laterality Date   • CARDIAC CATHETERIZATION     • COLONOSCOPY     • CORONARY ANGIOPLASTY WITH STENT PLACEMENT      5/2/2013; 70% stenosis in the LIMA to the LAD, % occluded, proximal LAD has 90% in stent stenosis, 100% distal left circumflex, 20% diffuse disease of the RCA.   • CORONARY ARTERY BYPASS GRAFT         Social History     Socioeconomic History   • Marital status:      Spouse name: Not on file   • Number of children: Not on file   • Years of education: Not on file   • Highest education level: Not on file   Tobacco Use   • Smoking status: Never Smoker   • Smokeless tobacco: Never Used   • Tobacco comment: CAFFEINE USE   Substance and Sexual Activity   • Alcohol use: Yes     Alcohol/week: 0.6 oz     Types: 1 Cans of beer per week     Comment: occassionally   • Drug use: No   • Sexual activity: Defer       Family History   Problem Relation Age of Onset   • Heart disease Mother    • Heart attack Mother    • Hypertension Father    • No Known Problems Maternal Grandmother    • Diabetes Maternal Grandfather    • No Known Problems Paternal Grandmother    • No Known Problems Paternal Grandfather        Review of Systems   Constitution: Negative for decreased appetite, fever, malaise/fatigue and weight loss.   HENT: Negative for nosebleeds.    Eyes: Negative for double vision.   Cardiovascular: Negative for chest pain, claudication, cyanosis, dyspnea on exertion, irregular heartbeat, leg  "swelling, near-syncope, orthopnea, palpitations, paroxysmal nocturnal dyspnea and syncope.   Respiratory: Negative for cough, hemoptysis and shortness of breath.    Hematologic/Lymphatic: Negative for bleeding problem.   Skin: Negative for rash.   Musculoskeletal: Negative for falls and myalgias.   Gastrointestinal: Negative for hematochezia, jaundice, melena, nausea and vomiting.   Genitourinary: Negative for hematuria.   Neurological: Negative for dizziness and seizures.   Psychiatric/Behavioral: Negative for altered mental status and memory loss.       No Known Allergies      Current Outpatient Medications:   •  atorvastatin (LIPITOR) 40 MG tablet, Take 1 tablet by mouth Daily., Disp: 90 tablet, Rfl: 2  •  carvedilol (COREG) 6.25 MG tablet, TAKE 1 TABLET TWICE A DAY, Disp: 180 tablet, Rfl: 4  •  clopidogrel (PLAVIX) 75 MG tablet, TAKE 1 TABLET DAILY, Disp: 90 tablet, Rfl: 3  •  Coenzyme Q10 (CO Q 10 PO), Take 1 tablet by mouth Daily., Disp: , Rfl:   •  lisinopril (PRINIVIL,ZESTRIL) 5 MG tablet, TAKE 1 TABLET DAILY, Disp: 90 tablet, Rfl: 2  •  nitroglycerin (NITROSTAT) 0.4 MG SL tablet, 1 under the tongue as needed for angina, may repeat q5mins for up three doses, Disp: 30 tablet, Rfl: 3  •  pantoprazole (PROTONIX) 40 MG EC tablet, Take 40 mg by mouth Daily., Disp: , Rfl:   •  methylPREDNISolone (MEDROL) 4 MG tablet, follow package directions, Disp: 1 each, Rfl: 0      Objective:     Vitals:    09/27/19 1121   BP: 120/88   Pulse: (!) 47   Weight: 82.9 kg (182 lb 12.8 oz)   Height: 175.3 cm (69\")     Body mass index is 26.99 kg/m².    Physical Exam   Constitutional: He is oriented to person, place, and time. He appears well-developed and well-nourished.   HENT:   Head: Normocephalic.   Eyes: No scleral icterus.   Neck: No JVD present. No thyromegaly present.   Cardiovascular: Normal rate, regular rhythm and normal heart sounds. Exam reveals no gallop and no friction rub.   No murmur heard.  Pulmonary/Chest: Effort " normal and breath sounds normal. He has no wheezes. He has no rales.       Abdominal: Soft. There is no hepatosplenomegaly. There is no tenderness.   Musculoskeletal: Normal range of motion. He exhibits no edema.   Lymphadenopathy:     He has no cervical adenopathy.   Neurological: He is alert and oriented to person, place, and time.   Skin: Skin is warm and dry. No rash noted.   Psychiatric: He has a normal mood and affect.         ECG 12 Lead  Date/Time: 9/27/2019 11:58 AM  Performed by: Torsten Law MD  Authorized by: Torsten Law MD   Comparison: compared with previous ECG   Similar to previous ECG  Rhythm: sinus rhythm    Clinical impression: normal ECG             Assessment:       Diagnosis Plan   1. Coronary artery disease involving coronary bypass graft of native heart without angina pectoris  Hepatic Function Panel    Lipid Panel    Stress Test With Myocardial Perfusion One Day   2. Essential hypertension  Hepatic Function Panel    Lipid Panel    Stress Test With Myocardial Perfusion One Day   3. Other hyperlipidemia  Hepatic Function Panel    Lipid Panel    Stress Test With Myocardial Perfusion One Day          Plan:       In general he is totally asymptomatic and doing okay he I think is been a little bit of a victim of aggressive treatment and we have taken a little less aggressive approach with him and he is thrived under that he has a stress test last year showed a small inferior defect I think is probably related to his circumflex he is asymptomatic next year I wonder we stress him again I did encourage him to get back to exercising.  I am going to have him stop his aspirin I do not think he needs that and I would have him come back and just stay on the clopidogrel long-term I have him see Melany in a year we are get a stress the next year and he will see me in 2 years if his defect is a lot different probably would be forced to cath him again    As always, it has been a pleasure to  participate in your patient's care.      Sincerely,       Torsten Law MD

## 2019-09-30 ENCOUNTER — APPOINTMENT (OUTPATIENT)
Dept: LAB | Facility: HOSPITAL | Age: 64
End: 2019-09-30

## 2019-09-30 DIAGNOSIS — E78.5 HYPERLIPIDEMIA, UNSPECIFIED HYPERLIPIDEMIA TYPE: Primary | ICD-10-CM

## 2019-09-30 LAB
ALBUMIN SERPL-MCNC: 4.5 G/DL (ref 3.5–5.2)
ALP SERPL-CCNC: 131 U/L (ref 39–117)
ALT SERPL W P-5'-P-CCNC: 80 U/L (ref 1–41)
AST SERPL-CCNC: 46 U/L (ref 1–40)
BILIRUB CONJ SERPL-MCNC: 0.2 MG/DL (ref 0.2–0.3)
BILIRUB INDIRECT SERPL-MCNC: 0.3 MG/DL
BILIRUB SERPL-MCNC: 0.5 MG/DL (ref 0.2–1.2)
CHOLEST SERPL-MCNC: 237 MG/DL (ref 0–200)
HDLC SERPL-MCNC: 72 MG/DL (ref 40–60)
LDLC SERPL CALC-MCNC: 154 MG/DL (ref 0–100)
LDLC/HDLC SERPL: 2.14 {RATIO}
PROT SERPL-MCNC: 6.7 G/DL (ref 6–8.5)
TRIGL SERPL-MCNC: 54 MG/DL (ref 0–150)
VLDLC SERPL-MCNC: 10.8 MG/DL (ref 5–40)

## 2019-09-30 PROCEDURE — 80076 HEPATIC FUNCTION PANEL: CPT | Performed by: INTERNAL MEDICINE

## 2019-09-30 PROCEDURE — 80061 LIPID PANEL: CPT | Performed by: INTERNAL MEDICINE

## 2019-09-30 PROCEDURE — 36415 COLL VENOUS BLD VENIPUNCTURE: CPT | Performed by: INTERNAL MEDICINE

## 2019-10-01 ENCOUNTER — OFFICE VISIT (OUTPATIENT)
Dept: FAMILY MEDICINE CLINIC | Facility: CLINIC | Age: 64
End: 2019-10-01

## 2019-10-01 VITALS
DIASTOLIC BLOOD PRESSURE: 70 MMHG | HEART RATE: 59 BPM | OXYGEN SATURATION: 97 % | BODY MASS INDEX: 27.25 KG/M2 | SYSTOLIC BLOOD PRESSURE: 112 MMHG | WEIGHT: 184 LBS | HEIGHT: 69 IN

## 2019-10-01 DIAGNOSIS — I25.810 CORONARY ARTERY DISEASE INVOLVING CORONARY BYPASS GRAFT OF NATIVE HEART WITHOUT ANGINA PECTORIS: ICD-10-CM

## 2019-10-01 DIAGNOSIS — E78.2 MIXED HYPERLIPIDEMIA: ICD-10-CM

## 2019-10-01 DIAGNOSIS — Z12.11 SCREEN FOR COLON CANCER: Primary | ICD-10-CM

## 2019-10-01 DIAGNOSIS — I10 ESSENTIAL HYPERTENSION: ICD-10-CM

## 2019-10-01 PROCEDURE — 99214 OFFICE O/P EST MOD 30 MIN: CPT | Performed by: NURSE PRACTITIONER

## 2019-10-01 RX ORDER — NITROGLYCERIN 0.4 MG/1
TABLET SUBLINGUAL
Qty: 25 TABLET | Refills: 3 | Status: SHIPPED | OUTPATIENT
Start: 2019-10-01 | End: 2021-04-14 | Stop reason: SDUPTHER

## 2019-10-01 RX ORDER — ATORVASTATIN CALCIUM 40 MG/1
TABLET, FILM COATED ORAL
Qty: 90 TABLET | Refills: 2 | Status: SHIPPED | OUTPATIENT
Start: 2019-10-01 | End: 2019-10-01 | Stop reason: SDUPTHER

## 2019-10-01 NOTE — PATIENT INSTRUCTIONS
Discharge instructions    Recommend vitamin D3 OTC 1000 international units daily to maintain healthy vitamin D levels  Labs every 6 months      Office visit in 6 months however  If your blood pressure is less than 130/80  If your liver function test is stable  If your LDL less than 60 and you are up-to-date with everything  Yearly appointments are okay as well

## 2019-10-08 ENCOUNTER — TELEPHONE (OUTPATIENT)
Dept: GASTROENTEROLOGY | Facility: CLINIC | Age: 64
End: 2019-10-08

## 2019-10-08 NOTE — TELEPHONE ENCOUNTER
----- Message from Toña Proctor sent at 10/8/2019  9:37 AM EDT -----  Regarding: medical records request  Contact: [phone]   [facility] Dr. James Epleyeeds pt previous colonoscopy report and the date he is due for one.  [phone] fax [phone]

## 2019-10-08 NOTE — TELEPHONE ENCOUNTER
C/s/ egd and path reports of 10/30/15  faxed to James Epley, APRN, as requested with notation that repeat c/s recommend in 3-4 yrs.    Message to Courtney CRUZ

## 2019-11-22 RX ORDER — CLOPIDOGREL BISULFATE 75 MG/1
75 TABLET ORAL DAILY
Qty: 30 TABLET | Refills: 0 | Status: SHIPPED | OUTPATIENT
Start: 2019-11-22 | End: 2021-04-14 | Stop reason: SDUPTHER

## 2020-02-12 RX ORDER — ATORVASTATIN CALCIUM 40 MG/1
TABLET, FILM COATED ORAL
Qty: 90 TABLET | Refills: 1 | Status: SHIPPED | OUTPATIENT
Start: 2020-02-12 | End: 2021-04-14 | Stop reason: SDUPTHER

## 2020-06-22 ENCOUNTER — TELEPHONE (OUTPATIENT)
Dept: GASTROENTEROLOGY | Facility: CLINIC | Age: 65
End: 2020-06-22

## 2020-06-22 NOTE — TELEPHONE ENCOUNTER
Called and left patient a voicemail to call the office back to schedule a new patient appointment with  within the month, (might have to send fe a message to get one over booked.)

## 2020-06-24 ENCOUNTER — TELEPHONE (OUTPATIENT)
Dept: GASTROENTEROLOGY | Facility: CLINIC | Age: 65
End: 2020-06-24

## 2020-06-24 NOTE — TELEPHONE ENCOUNTER
Called and left patient a voicemail to call back and make a new patient appointment with  for a fatty liver. Referred by YASSINE Gruber M.D.

## 2020-10-01 ENCOUNTER — HOSPITAL ENCOUNTER (OUTPATIENT)
Dept: CARDIOLOGY | Facility: HOSPITAL | Age: 65
Discharge: HOME | End: 2020-10-01
Admitting: INTERNAL MEDICINE

## 2020-10-01 VITALS — HEIGHT: 69 IN | WEIGHT: 185 LBS | BODY MASS INDEX: 27.4 KG/M2

## 2020-10-01 DIAGNOSIS — E78.49 OTHER HYPERLIPIDEMIA: ICD-10-CM

## 2020-10-01 DIAGNOSIS — I10 ESSENTIAL HYPERTENSION: ICD-10-CM

## 2020-10-01 DIAGNOSIS — I25.810 CORONARY ARTERY DISEASE INVOLVING CORONARY BYPASS GRAFT OF NATIVE HEART WITHOUT ANGINA PECTORIS: ICD-10-CM

## 2020-10-01 LAB
BH CV NUCLEAR PRIOR STUDY: 2
BH CV STRESS BP STAGE 1: NORMAL
BH CV STRESS BP STAGE 2: NORMAL
BH CV STRESS BP STAGE 3: NORMAL
BH CV STRESS BP STAGE 4: NORMAL
BH CV STRESS DURATION MIN STAGE 1: 3
BH CV STRESS DURATION MIN STAGE 2: 3
BH CV STRESS DURATION MIN STAGE 3: 3
BH CV STRESS DURATION MIN STAGE 4: 3
BH CV STRESS DURATION SEC STAGE 1: 0
BH CV STRESS DURATION SEC STAGE 2: 0
BH CV STRESS DURATION SEC STAGE 3: 0
BH CV STRESS DURATION SEC STAGE 4: 0
BH CV STRESS GRADE STAGE 1: 10
BH CV STRESS GRADE STAGE 2: 12
BH CV STRESS GRADE STAGE 3: 14
BH CV STRESS GRADE STAGE 4: 16
BH CV STRESS HR STAGE 1: 94
BH CV STRESS HR STAGE 2: 105
BH CV STRESS HR STAGE 3: 121
BH CV STRESS HR STAGE 4: 158
BH CV STRESS METS STAGE 1: 5
BH CV STRESS METS STAGE 2: 7.5
BH CV STRESS METS STAGE 3: 10
BH CV STRESS METS STAGE 4: 13.5
BH CV STRESS PROTOCOL 1: NORMAL
BH CV STRESS RECOVERY BP: NORMAL MMHG
BH CV STRESS RECOVERY HR: 88 BPM
BH CV STRESS SPEED STAGE 1: 1.7
BH CV STRESS SPEED STAGE 2: 2.5
BH CV STRESS SPEED STAGE 3: 3.4
BH CV STRESS SPEED STAGE 4: 4.2
BH CV STRESS STAGE 1: 1
BH CV STRESS STAGE 2: 2
BH CV STRESS STAGE 3: 3
BH CV STRESS STAGE 4: 4
LV EF NUC BP: 64 %
MAXIMAL PREDICTED HEART RATE: 156 BPM
PERCENT MAX PREDICTED HR: 101.28 %
STRESS BASELINE BP: NORMAL MMHG
STRESS BASELINE HR: 68 BPM
STRESS PERCENT HR: 119 %
STRESS POST ESTIMATED WORKLOAD: 13.5 METS
STRESS POST EXERCISE DUR MIN: 12 MIN
STRESS POST EXERCISE DUR SEC: 0 SEC
STRESS POST PEAK BP: NORMAL MMHG
STRESS POST PEAK HR: 158 BPM
STRESS TARGET HR: 133 BPM

## 2020-10-01 PROCEDURE — 93017 CV STRESS TEST TRACING ONLY: CPT

## 2020-10-01 PROCEDURE — 0 TECHNETIUM TETROFOSMIN KIT: Performed by: INTERNAL MEDICINE

## 2020-10-01 PROCEDURE — 78452 HT MUSCLE IMAGE SPECT MULT: CPT | Performed by: INTERNAL MEDICINE

## 2020-10-01 PROCEDURE — 78452 HT MUSCLE IMAGE SPECT MULT: CPT

## 2020-10-01 PROCEDURE — 93016 CV STRESS TEST SUPVJ ONLY: CPT | Performed by: INTERNAL MEDICINE

## 2020-10-01 PROCEDURE — 93018 CV STRESS TEST I&R ONLY: CPT | Performed by: INTERNAL MEDICINE

## 2020-10-01 PROCEDURE — A9502 TC99M TETROFOSMIN: HCPCS | Performed by: INTERNAL MEDICINE

## 2020-10-01 RX ADMIN — TETROFOSMIN 1 DOSE: 1.38 INJECTION, POWDER, LYOPHILIZED, FOR SOLUTION INTRAVENOUS at 07:33

## 2020-10-01 RX ADMIN — TETROFOSMIN 1 DOSE: 1.38 INJECTION, POWDER, LYOPHILIZED, FOR SOLUTION INTRAVENOUS at 08:31

## 2021-01-27 ENCOUNTER — TELEPHONE (OUTPATIENT)
Dept: GASTROENTEROLOGY | Facility: CLINIC | Age: 66
End: 2021-01-27

## 2021-01-27 NOTE — TELEPHONE ENCOUNTER
Patient is requesting CS and EGD     Last scopes 10/30/2015-- personal hx of polyps-- mother and father hx of polyps--no ASA-- Plavix-- medications:     atorvastatin (LIPITOR) 40 MG tablet     carvedilol (COREG) 6.25 MG tablet     clopidogrel (PLAVIX) 75 MG tablet     Coenzyme Q10 (CO Q 10 PO)     lisinopril (PRINIVIL,ZESTRIL) 5 MG tablet     nitroglycerin (NITROSTAT) 0.4 MG SL tablet     pantoprazole (PROTONIX) 40 MG EC tablet          OA form and last scope scanned into media

## 2021-01-29 ENCOUNTER — PREP FOR SURGERY (OUTPATIENT)
Dept: OTHER | Facility: HOSPITAL | Age: 66
End: 2021-01-29

## 2021-01-29 ENCOUNTER — TELEPHONE (OUTPATIENT)
Dept: GASTROENTEROLOGY | Facility: CLINIC | Age: 66
End: 2021-01-29

## 2021-01-29 DIAGNOSIS — R10.13 DYSPEPSIA: Primary | ICD-10-CM

## 2021-01-29 DIAGNOSIS — K63.5 COLON POLYP: Primary | ICD-10-CM

## 2021-01-29 DIAGNOSIS — Z83.71 FAMILY HISTORY OF COLONIC POLYPS: ICD-10-CM

## 2021-01-29 DIAGNOSIS — K63.5 COLON POLYP: ICD-10-CM

## 2021-01-29 NOTE — TELEPHONE ENCOUNTER
Request sent to Dr Law for clearance to hold Plavix.     Patient also request the EGD to be done at the same time.  Can this be added?

## 2021-01-29 NOTE — TELEPHONE ENCOUNTER
Good morning     Dr Quiñones has placed a case request for this patient to have a colonoscopy and would like for him to hold the Plavix for 5 days prior to procedure.  Please let us know if this is ok.    Thank you

## 2021-02-02 PROBLEM — R10.13 DYSPEPSIA: Status: ACTIVE | Noted: 2021-02-02

## 2021-02-02 PROBLEM — Z83.71 FAMILY HISTORY OF COLONIC POLYPS: Status: ACTIVE | Noted: 2021-02-02

## 2021-02-02 PROBLEM — K63.5 COLON POLYP: Status: ACTIVE | Noted: 2021-02-02

## 2021-02-02 PROBLEM — Z83.719 FAMILY HISTORY OF COLONIC POLYPS: Status: ACTIVE | Noted: 2021-02-02

## 2021-03-18 ENCOUNTER — TRANSCRIBE ORDERS (OUTPATIENT)
Dept: SLEEP MEDICINE | Facility: HOSPITAL | Age: 66
End: 2021-03-18

## 2021-03-18 DIAGNOSIS — Z01.818 OTHER SPECIFIED PRE-OPERATIVE EXAMINATION: Primary | ICD-10-CM

## 2021-03-22 ENCOUNTER — BULK ORDERING (OUTPATIENT)
Dept: CASE MANAGEMENT | Facility: OTHER | Age: 66
End: 2021-03-22

## 2021-03-22 ENCOUNTER — LAB (OUTPATIENT)
Dept: LAB | Facility: HOSPITAL | Age: 66
End: 2021-03-22

## 2021-03-22 DIAGNOSIS — Z01.818 OTHER SPECIFIED PRE-OPERATIVE EXAMINATION: ICD-10-CM

## 2021-03-22 DIAGNOSIS — Z23 IMMUNIZATION DUE: ICD-10-CM

## 2021-03-22 PROCEDURE — C9803 HOPD COVID-19 SPEC COLLECT: HCPCS

## 2021-03-22 PROCEDURE — U0004 COV-19 TEST NON-CDC HGH THRU: HCPCS

## 2021-03-23 LAB — SARS-COV-2 RNA RESP QL NAA+PROBE: NOT DETECTED

## 2021-03-24 ENCOUNTER — HOSPITAL ENCOUNTER (OUTPATIENT)
Facility: HOSPITAL | Age: 66
Setting detail: HOSPITAL OUTPATIENT SURGERY
Discharge: HOME | End: 2021-03-24
Attending: INTERNAL MEDICINE | Admitting: INTERNAL MEDICINE

## 2021-03-24 ENCOUNTER — ANESTHESIA EVENT (OUTPATIENT)
Dept: GASTROENTEROLOGY | Facility: HOSPITAL | Age: 66
End: 2021-03-24

## 2021-03-24 ENCOUNTER — ANESTHESIA (OUTPATIENT)
Dept: GASTROENTEROLOGY | Facility: HOSPITAL | Age: 66
End: 2021-03-24

## 2021-03-24 VITALS
RESPIRATION RATE: 16 BRPM | BODY MASS INDEX: 27.55 KG/M2 | DIASTOLIC BLOOD PRESSURE: 70 MMHG | HEART RATE: 67 BPM | HEIGHT: 69 IN | SYSTOLIC BLOOD PRESSURE: 103 MMHG | WEIGHT: 186 LBS | OXYGEN SATURATION: 97 %

## 2021-03-24 DIAGNOSIS — R10.13 DYSPEPSIA: ICD-10-CM

## 2021-03-24 DIAGNOSIS — Z83.71 FAMILY HISTORY OF COLONIC POLYPS: ICD-10-CM

## 2021-03-24 DIAGNOSIS — K63.5 COLON POLYP: ICD-10-CM

## 2021-03-24 LAB
ALBUMIN SERPL-MCNC: 3.9 G/DL (ref 3.5–5.2)
ALBUMIN/GLOB SERPL: 2.1 G/DL
ALP SERPL-CCNC: 77 U/L (ref 39–117)
ALT SERPL W P-5'-P-CCNC: 24 U/L (ref 1–41)
ANION GAP SERPL CALCULATED.3IONS-SCNC: 7.7 MMOL/L (ref 5–15)
AST SERPL-CCNC: 20 U/L (ref 1–40)
BASOPHILS # BLD AUTO: 0.08 10*3/MM3 (ref 0–0.2)
BASOPHILS NFR BLD AUTO: 1.6 % (ref 0–1.5)
BILIRUB SERPL-MCNC: 0.8 MG/DL (ref 0–1.2)
BUN SERPL-MCNC: 10 MG/DL (ref 8–23)
BUN/CREAT SERPL: 13 (ref 7–25)
CALCIUM SPEC-SCNC: 8.4 MG/DL (ref 8.6–10.5)
CHLORIDE SERPL-SCNC: 105 MMOL/L (ref 98–107)
CO2 SERPL-SCNC: 27.3 MMOL/L (ref 22–29)
CREAT SERPL-MCNC: 0.77 MG/DL (ref 0.76–1.27)
DEPRECATED RDW RBC AUTO: 40 FL (ref 37–54)
EOSINOPHIL # BLD AUTO: 0.33 10*3/MM3 (ref 0–0.4)
EOSINOPHIL NFR BLD AUTO: 6.6 % (ref 0.3–6.2)
ERYTHROCYTE [DISTWIDTH] IN BLOOD BY AUTOMATED COUNT: 12 % (ref 12.3–15.4)
GFR SERPL CREATININE-BSD FRML MDRD: 101 ML/MIN/1.73
GLOBULIN UR ELPH-MCNC: 1.9 GM/DL
GLUCOSE SERPL-MCNC: 99 MG/DL (ref 65–99)
HCT VFR BLD AUTO: 42.7 % (ref 37.5–51)
HGB BLD-MCNC: 14.7 G/DL (ref 13–17.7)
IMM GRANULOCYTES # BLD AUTO: 0.01 10*3/MM3 (ref 0–0.05)
IMM GRANULOCYTES NFR BLD AUTO: 0.2 % (ref 0–0.5)
LYMPHOCYTES # BLD AUTO: 1.35 10*3/MM3 (ref 0.7–3.1)
LYMPHOCYTES NFR BLD AUTO: 27.1 % (ref 19.6–45.3)
MCH RBC QN AUTO: 31.2 PG (ref 26.6–33)
MCHC RBC AUTO-ENTMCNC: 34.4 G/DL (ref 31.5–35.7)
MCV RBC AUTO: 90.7 FL (ref 79–97)
MONOCYTES # BLD AUTO: 0.49 10*3/MM3 (ref 0.1–0.9)
MONOCYTES NFR BLD AUTO: 9.8 % (ref 5–12)
NEUTROPHILS NFR BLD AUTO: 2.72 10*3/MM3 (ref 1.7–7)
NEUTROPHILS NFR BLD AUTO: 54.7 % (ref 42.7–76)
NRBC BLD AUTO-RTO: 0 /100 WBC (ref 0–0.2)
PLATELET # BLD AUTO: 165 10*3/MM3 (ref 140–450)
PMV BLD AUTO: 10.4 FL (ref 6–12)
POTASSIUM SERPL-SCNC: 4.6 MMOL/L (ref 3.5–5.2)
PROT SERPL-MCNC: 5.8 G/DL (ref 6–8.5)
RBC # BLD AUTO: 4.71 10*6/MM3 (ref 4.14–5.8)
SODIUM SERPL-SCNC: 140 MMOL/L (ref 136–145)
WBC # BLD AUTO: 4.98 10*3/MM3 (ref 3.4–10.8)

## 2021-03-24 PROCEDURE — 25010000002 PROPOFOL 10 MG/ML EMULSION: Performed by: ANESTHESIOLOGY

## 2021-03-24 PROCEDURE — 85025 COMPLETE CBC W/AUTO DIFF WBC: CPT | Performed by: INTERNAL MEDICINE

## 2021-03-24 PROCEDURE — 88342 IMHCHEM/IMCYTCHM 1ST ANTB: CPT | Performed by: INTERNAL MEDICINE

## 2021-03-24 PROCEDURE — 45381 COLONOSCOPY SUBMUCOUS NJX: CPT | Performed by: INTERNAL MEDICINE

## 2021-03-24 PROCEDURE — 45385 COLONOSCOPY W/LESION REMOVAL: CPT | Performed by: INTERNAL MEDICINE

## 2021-03-24 PROCEDURE — 43239 EGD BIOPSY SINGLE/MULTIPLE: CPT | Performed by: INTERNAL MEDICINE

## 2021-03-24 PROCEDURE — 88305 TISSUE EXAM BY PATHOLOGIST: CPT | Performed by: INTERNAL MEDICINE

## 2021-03-24 PROCEDURE — 45380 COLONOSCOPY AND BIOPSY: CPT | Performed by: INTERNAL MEDICINE

## 2021-03-24 PROCEDURE — 80053 COMPREHEN METABOLIC PANEL: CPT | Performed by: INTERNAL MEDICINE

## 2021-03-24 DEVICE — DEV CLIP ENDO RESOLUTION360 CONTRL ROT 235CM: Type: IMPLANTABLE DEVICE | Site: CECUM | Status: FUNCTIONAL

## 2021-03-24 RX ORDER — SODIUM CHLORIDE, SODIUM LACTATE, POTASSIUM CHLORIDE, CALCIUM CHLORIDE 600; 310; 30; 20 MG/100ML; MG/100ML; MG/100ML; MG/100ML
INJECTION, SOLUTION INTRAVENOUS CONTINUOUS PRN
Status: DISCONTINUED | OUTPATIENT
Start: 2021-03-24 | End: 2021-03-24 | Stop reason: SURG

## 2021-03-24 RX ORDER — PROPOFOL 10 MG/ML
VIAL (ML) INTRAVENOUS AS NEEDED
Status: DISCONTINUED | OUTPATIENT
Start: 2021-03-24 | End: 2021-03-24 | Stop reason: SURG

## 2021-03-24 RX ORDER — PROPOFOL 10 MG/ML
VIAL (ML) INTRAVENOUS CONTINUOUS PRN
Status: DISCONTINUED | OUTPATIENT
Start: 2021-03-24 | End: 2021-03-24 | Stop reason: SURG

## 2021-03-24 RX ORDER — LIDOCAINE HYDROCHLORIDE 20 MG/ML
INJECTION, SOLUTION INFILTRATION; PERINEURAL AS NEEDED
Status: DISCONTINUED | OUTPATIENT
Start: 2021-03-24 | End: 2021-03-24 | Stop reason: SURG

## 2021-03-24 RX ORDER — SODIUM CHLORIDE, SODIUM LACTATE, POTASSIUM CHLORIDE, CALCIUM CHLORIDE 600; 310; 30; 20 MG/100ML; MG/100ML; MG/100ML; MG/100ML
1000 INJECTION, SOLUTION INTRAVENOUS CONTINUOUS
Status: DISCONTINUED | OUTPATIENT
Start: 2021-03-24 | End: 2021-03-24 | Stop reason: HOSPADM

## 2021-03-24 RX ADMIN — LIDOCAINE HYDROCHLORIDE 60 MG: 20 INJECTION, SOLUTION INFILTRATION; PERINEURAL at 10:22

## 2021-03-24 RX ADMIN — PROPOFOL 100 MG: 10 INJECTION, EMULSION INTRAVENOUS at 10:22

## 2021-03-24 RX ADMIN — SODIUM CHLORIDE, POTASSIUM CHLORIDE, SODIUM LACTATE AND CALCIUM CHLORIDE: 600; 310; 30; 20 INJECTION, SOLUTION INTRAVENOUS at 10:22

## 2021-03-24 RX ADMIN — PROPOFOL 160 MCG/KG/MIN: 10 INJECTION, EMULSION INTRAVENOUS at 10:22

## 2021-03-24 RX ADMIN — SODIUM CHLORIDE, POTASSIUM CHLORIDE, SODIUM LACTATE AND CALCIUM CHLORIDE 1000 ML: 600; 310; 30; 20 INJECTION, SOLUTION INTRAVENOUS at 09:55

## 2021-03-24 NOTE — H&P
[facility]  Pre Procedure History & Physical    Chief Complaint:   Time for my colonoscopy    Subjective     HPI:   66 y.o. male retired [employer]  with a history of CABG and reflux.  He also has a history of colon polyps.  His mom and dad had colon polyps. His last colonoscopy was in 10/2015.    Past Medical History:   Past Medical History:   Diagnosis Date   • Acute non-ST-elevation myocardial infarction (CMS/HCC) 3/4/2016    Description: 7/07 tx with Taxus stent to dis circ   • CAD (coronary artery disease)    • Cancer (CMS/HCC)     testicular   • Coronary arteriosclerosis in native artery 3/4/2016    Description: due to FAA cath in 1/08 restenosis in circ and new lesion in LAD tx with cypher stents; 9/12 no sx but due to FAA relooked and CABG LIMA to LAD and SVG to circ by Dr Goode; 5/13 recath no sx and LIMA atretic and SVG to circ gone   • GERD (gastroesophageal reflux disease)    • Hyperlipidemia    • Hypertension    • NSTEMI (non-ST elevated myocardial infarction) (CMS/HCC)        Family History:  Family History   Problem Relation Age of Onset   • Heart disease Mother    • Heart attack Mother    • Hypertension Father    • No Known Problems Maternal Grandmother    • Diabetes Maternal Grandfather    • No Known Problems Paternal Grandmother    • No Known Problems Paternal Grandfather        Social History:   reports that he has never smoked. He has never used smokeless tobacco. He reports current alcohol use of about 1.0 standard drinks of alcohol per week. He reports that he does not use drugs.    Medications:   Medications Prior to Admission   Medication Sig Dispense Refill Last Dose   • atorvastatin (LIPITOR) 40 MG tablet TAKE ONE TABLET BY MOUTH DAILY 90 tablet 1 3/23/2021 at Unknown time   • carvedilol (COREG) 6.25 MG tablet TAKE 1 TABLET TWICE A  tablet 4 3/23/2021 at Unknown time   • Coenzyme Q10 (CO Q 10 PO) Take 1 tablet by mouth Daily.   3/23/2021 at Unknown time   •  "lisinopril (PRINIVIL,ZESTRIL) 5 MG tablet TAKE 1 TABLET DAILY 90 tablet 2 3/23/2021 at Unknown time   • pantoprazole (PROTONIX) 40 MG EC tablet Take 40 mg by mouth Daily.   3/23/2021 at Unknown time   • clopidogrel (PLAVIX) 75 MG tablet Take 1 tablet by mouth Daily. 30 tablet 0 3/18/2021   • nitroglycerin (NITROSTAT) 0.4 MG SL tablet 1 under the tongue as needed for angina, may repeat q5mins for up three doses call 911 if not resolved 25 tablet 3 Unknown at Unknown time       Allergies:  Patient has no known allergies.    ROS:    Pertinent items are noted in HPI     Objective     Blood pressure 112/69, pulse 61, resp. rate 16, height 175.3 cm (69\"), weight 84.4 kg (186 lb), SpO2 96 %.    Physical Exam   Constitutional: Pt is oriented to person, place, and time and well-developed, well-nourished, and in no distress.   HENT:   Mouth/Throat: Oropharynx is clear and moist.   Neck: Normal range of motion. Neck supple.   Cardiovascular: Normal rate, regular rhythm and normal heart sounds.    Pulmonary/Chest: Effort normal and breath sounds normal. No respiratory distress. No  wheezes.   Abdominal: Soft. Bowel sounds are normal.   Skin: Skin is warm and dry.   Psychiatric: Mood, memory, affect and judgment normal.     Assessment/Plan     Diagnosis:  66 y.o. male retired [employer]  with a history of CABG and reflux.  He also has a history of colon polyps.  His mom and dad had colon polyps. His last colonoscopy was in 10/2015.    Anticipated Surgical Procedure:  EGD and Colonoscopy    The risks, benefits, and alternatives of this procedure have been discussed with the patient or the responsible party- the patient understands and agrees to proceed.    Shaq Quiñones M.D.  "

## 2021-03-24 NOTE — ANESTHESIA POSTPROCEDURE EVALUATION
"Patient: Khai Slater    Procedure Summary     Date: 03/24/21 Room / Location:  [facility]    Anesthesia Start: 1022 Anesthesia Stop:     Procedures:       ESOPHAGOGASTRODUODENOSCOPY WITH COLD BIOPSIES (N/A Esophagus)      COLONOSCOPY INTO CECUM AND T.I WITH SALINE LIFT COLD SNARE POLYPECTOMY  WITH CLIP PLACEMENT X1 IN CECUM AND COLD BIOPSY POLYPECTOMIES (N/A ) Diagnosis:       Dyspepsia      Colon polyp      Family history of colonic polyps      (Dyspepsia [R10.13])      (Colon polyp [K63.5])      (Family history of colonic polyps [Z83.71])    Surgeons: Sahq Quiñones MD Provider: Torsten Sharma MD    Anesthesia Type: MAC ASA Status: 3          Anesthesia Type: MAC    Vitals  No vitals data found for the desired time range.          Post Anesthesia Care and Evaluation    Patient location during evaluation: bedside  Patient participation: complete - patient participated  Level of consciousness: awake and alert  Pain management: adequate  Airway patency: patent  Anesthetic complications: No anesthetic complications    Cardiovascular status: acceptable  Respiratory status: acceptable  Hydration status: acceptable    Comments: /69 (BP Location: Left arm, Patient Position: Sitting)   Pulse 61   Resp 16   Ht 175.3 cm (69\")   Wt 84.4 kg (186 lb)   SpO2 96%   BMI 27.47 kg/m²       "

## 2021-03-24 NOTE — ANESTHESIA PREPROCEDURE EVALUATION
Anesthesia Evaluation     NPO Solid Status: > 8 hours  NPO Liquid Status: > 2 hours           Airway   Mallampati: II  TM distance: >3 FB  Neck ROM: full  no difficulty expected  Dental - normal exam     Pulmonary - normal exam   (-) decreased breath sounds, wheezes, not a smoker  Cardiovascular - normal exam    (+) hypertension, past MI  >12 months, CAD, CABG >6 Months, hyperlipidemia,       Neuro/Psych  GI/Hepatic/Renal/Endo      Musculoskeletal     Abdominal  - normal exam   Substance History      OB/GYN          Other                        Anesthesia Plan    ASA 3     MAC     intravenous induction     Anesthetic plan, all risks, benefits, and alternatives have been provided, discussed and informed consent has been obtained with: patient.

## 2021-03-25 LAB
LAB AP CASE REPORT: NORMAL
PATH REPORT.FINAL DX SPEC: NORMAL
PATH REPORT.GROSS SPEC: NORMAL

## 2021-03-29 NOTE — PROGRESS NOTES
03/28/21  Path from the recent EGD showed some inflammation of the stomach (but no evidence of helicobacter pylori lining the stomach, which is good). The distal esopahgeal biopsies did show some inflammation from GERD. The colon polyps that were removed were not cancerous and no precancerous. I recommend a repeat colonoscopy in 2 yrs. FAX to his PCP.   Magda ferrari

## 2021-03-31 ENCOUNTER — OFFICE VISIT (OUTPATIENT)
Dept: GASTROENTEROLOGY | Facility: CLINIC | Age: 66
End: 2021-03-31

## 2021-03-31 ENCOUNTER — TELEPHONE (OUTPATIENT)
Dept: GASTROENTEROLOGY | Facility: CLINIC | Age: 66
End: 2021-03-31

## 2021-03-31 VITALS
WEIGHT: 189.4 LBS | TEMPERATURE: 97.6 F | BODY MASS INDEX: 28.05 KG/M2 | HEIGHT: 69 IN | SYSTOLIC BLOOD PRESSURE: 102 MMHG | DIASTOLIC BLOOD PRESSURE: 68 MMHG

## 2021-03-31 DIAGNOSIS — K21.9 GASTROESOPHAGEAL REFLUX DISEASE, UNSPECIFIED WHETHER ESOPHAGITIS PRESENT: Primary | ICD-10-CM

## 2021-03-31 DIAGNOSIS — Z83.71 FAMILY HISTORY OF COLONIC POLYPS: ICD-10-CM

## 2021-03-31 DIAGNOSIS — K63.5 POLYP OF COLON, UNSPECIFIED PART OF COLON, UNSPECIFIED TYPE: ICD-10-CM

## 2021-03-31 PROCEDURE — 99213 OFFICE O/P EST LOW 20 MIN: CPT | Performed by: INTERNAL MEDICINE

## 2021-03-31 RX ORDER — PANTOPRAZOLE SODIUM 40 MG/1
40 TABLET, DELAYED RELEASE ORAL DAILY
Qty: 90 TABLET | Refills: 3 | Status: SHIPPED | OUTPATIENT
Start: 2021-03-31 | End: 2021-04-14 | Stop reason: SDUPTHER

## 2021-03-31 NOTE — TELEPHONE ENCOUNTER
----- Message from Shaq Quiñones MD sent at 3/28/2021  9:07 PM EDT -----  03/28/21  Path from the recent EGD showed some inflammation of the stomach (but no evidence of helicobacter pylori lining the stomach, which is good). The distal esopahgeal biopsies did show some inflammation from GERD. The colon polyps that were removed were not cancerous and no precancerous. I recommend a repeat colonoscopy in 2 yrs. FAX to his PCP.   Magda kjleann

## 2021-03-31 NOTE — TELEPHONE ENCOUNTER
Pt has f/u appt today 03/31 with Dr Quiñones.     C/s placed in [facility] and  for 03/24/2023.    Results sent to Dr Gruber thru [facility].

## 2021-03-31 NOTE — PROGRESS NOTES
Chief Complaint   Patient presents with   • Follow-up       History of Present Illness:   66 y.o. male retired [employer]  with a history of CABG and reflux. He also has a history of colon polyps.  His mom and dad had colon polyps. His last colonoscopy and EGD were earlier this month (3/24/21) .  The EGD showed reflux esophagitis and gastritis.  The colonoscopy showed 3 small colon polyps that were removed.  After reviewing the pathology report I had said:  Path from the recent EGD showed some inflammation of the stomach (but no evidence of helicobacter pylori lining the stomach, which is good). The distal esopahgeal biopsies did show some inflammation from GERD. The colon polyps that were removed were not cancerous and no precancerous. I recommend a repeat colonoscopy in 2 yrs.       No abdominal or chest pain. No nausea or vomting. NO diarrhea or cosntiation. No rectal bleeding or melena. Drinks lots of coffee.     Past Medical History:   Diagnosis Date   • Acute non-ST-elevation myocardial infarction (CMS/HCC) 3/4/2016    Description: 7/07 tx with Taxus stent to dis circ   • CAD (coronary artery disease)    • Cancer (CMS/HCC)     testicular   • Coronary arteriosclerosis in native artery 3/4/2016    Description: due to FAA cath in 1/08 restenosis in circ and new lesion in LAD tx with cypher stents; 9/12 no sx but due to FAA relooked and CABG LIMA to LAD and SVG to circ by Dr Goode; 5/13 recath no sx and LIMA atretic and SVG to circ gone   • GERD (gastroesophageal reflux disease)    • Hyperlipidemia    • Hypertension    • NSTEMI (non-ST elevated myocardial infarction) (CMS/HCC)        Past Surgical History:   Procedure Laterality Date   • CARDIAC CATHETERIZATION     • COLONOSCOPY     • COLONOSCOPY N/A 3/24/2021    Procedure: COLONOSCOPY INTO CECUM AND T.I WITH SALINE LIFT COLD SNARE POLYPECTOMY  WITH CLIP PLACEMENT X1 IN CECUM AND COLD BIOPSY POLYPECTOMIES;  Surgeon: Shaq Quiñones MD;  Location: [facility] ENDOSCOPY;   Service: Gastroenterology;  Laterality: N/A;  PRE- HISTORY OF COLON POLYPS, PERSONAL HISTORY OF COLON POLYPS  POST- POLYPS, INTERNAL HEMORRHOIDS   • CORONARY ANGIOPLASTY WITH STENT PLACEMENT      5/2/2013; 70% stenosis in the LIMA to the LAD, % occluded, proximal LAD has 90% in stent stenosis, 100% distal left circumflex, 20% diffuse disease of the RCA.   • CORONARY ARTERY BYPASS GRAFT     • ENDOSCOPY N/A 3/24/2021    Procedure: ESOPHAGOGASTRODUODENOSCOPY WITH COLD BIOPSIES;  Surgeon: Shaq Quiñones MD;  Location: [facility] ENDOSCOPY;  Service: Gastroenterology;  Laterality: N/A;  PRE- DYSPEPSIA  POST- ESOPHAGITIS, GASTRITIS   • ORCHIECTOMY Left          Current Outpatient Medications:   •  atorvastatin (LIPITOR) 40 MG tablet, TAKE ONE TABLET BY MOUTH DAILY, Disp: 90 tablet, Rfl: 1  •  carvedilol (COREG) 6.25 MG tablet, TAKE 1 TABLET TWICE A DAY, Disp: 180 tablet, Rfl: 4  •  clopidogrel (PLAVIX) 75 MG tablet, Take 1 tablet by mouth Daily., Disp: 30 tablet, Rfl: 0  •  Coenzyme Q10 (CO Q 10 PO), Take 1 tablet by mouth Daily., Disp: , Rfl:   •  lisinopril (PRINIVIL,ZESTRIL) 5 MG tablet, TAKE 1 TABLET DAILY, Disp: 90 tablet, Rfl: 2  •  nitroglycerin (NITROSTAT) 0.4 MG SL tablet, 1 under the tongue as needed for angina, may repeat q5mins for up three doses call 911 if not resolved, Disp: 25 tablet, Rfl: 3  •  pantoprazole (PROTONIX) 40 MG EC tablet, Take 1 tablet by mouth Daily., Disp: 90 tablet, Rfl: 3    No Known Allergies    Family History   Problem Relation Age of Onset   • Heart disease Mother    • Heart attack Mother    • Hypertension Father    • No Known Problems Maternal Grandmother    • Diabetes Maternal Grandfather    • No Known Problems Paternal Grandmother    • No Known Problems Paternal Grandfather        Social History     Socioeconomic History   • Marital status:      Spouse name: Not on file   • Number of children: Not on file   • Years of education: Not on file   • Highest education level: Not  on file   Tobacco Use   • Smoking status: Never Smoker   • Smokeless tobacco: Never Used   • Tobacco comment: CAFFEINE USE   Substance and Sexual Activity   • Alcohol use: Yes     Alcohol/week: 1.0 standard drinks     Types: 1 Cans of beer per week     Comment: occassionally   • Drug use: No   • Sexual activity: Defer       Review of Systems   Gastrointestinal: Negative for abdominal pain.     Pertinent positives and negatives documented in the HPI and all other systems reviewed and were found to be negative.  Vitals:    03/31/21 1559   BP: 102/68   Temp: 97.6 °F (36.4 °C)       Physical Exam  Vitals reviewed.   Constitutional:       General: He is not in acute distress.     Appearance: Normal appearance. He is well-developed. He is not diaphoretic.   HENT:      Head: Normocephalic and atraumatic. Hair is normal.      Right Ear: Hearing, tympanic membrane, ear canal and external ear normal.      Left Ear: Hearing, tympanic membrane, ear canal and external ear normal.      Nose: Nose normal. No nasal deformity.      Mouth/Throat:      Mouth: Mucous membranes are moist. No oral lesions.      Pharynx: Uvula midline. No uvula swelling.   Eyes:      General: Lids are normal. No scleral icterus.        Right eye: No discharge.         Left eye: No discharge.      Extraocular Movements: Extraocular movements intact.      Right eye: Normal extraocular motion and no nystagmus.      Left eye: Normal extraocular motion and no nystagmus.      Conjunctiva/sclera: Conjunctivae normal.      Pupils: Pupils are equal, round, and reactive to light.   Neck:      Thyroid: No thyromegaly.      Vascular: No JVD.   Cardiovascular:      Rate and Rhythm: Normal rate and regular rhythm.      Pulses: Normal pulses.      Heart sounds: Normal heart sounds. No murmur heard.   No gallop.    Pulmonary:      Effort: Pulmonary effort is normal. No respiratory distress.      Breath sounds: Normal breath sounds. No wheezing or rales.   Chest:       Chest wall: No tenderness.   Abdominal:      General: Bowel sounds are normal. There is no distension.      Palpations: Abdomen is soft. There is no mass.      Tenderness: There is no abdominal tenderness. There is no guarding.      Hernia: No hernia is present.   Musculoskeletal:         General: No tenderness or deformity. Normal range of motion.      Cervical back: Normal range of motion and neck supple.   Lymphadenopathy:      Cervical: No cervical adenopathy.   Skin:     General: Skin is warm and dry.      Findings: No rash.   Neurological:      Mental Status: He is alert and oriented to person, place, and time.      Cranial Nerves: No cranial nerve deficit.      Motor: No abnormal muscle tone.      Coordination: Coordination normal.      Deep Tendon Reflexes: Reflexes are normal and symmetric. Reflexes normal.   Psychiatric:         Mood and Affect: Mood normal.         Behavior: Behavior normal.         Thought Content: Thought content normal.         Judgment: Judgment normal.         Diagnoses and all orders for this visit:    1. Gastroesophageal reflux disease, unspecified whether esophagitis present (Primary)  -     pantoprazole (PROTONIX) 40 MG EC tablet; Take 1 tablet by mouth Daily.  Dispense: 90 tablet; Refill: 3    2. Polyp of colon, unspecified part of colon, unspecified type    3. Family history of colonic polyps      Assessment:  1.  Gastroesophageal reflux disease.  2.  History of colon polyps.  3.  Family history of colon polyps in that both parents had colon polyps.  4. H/o testicular cancer in past.     Recommendations:  1. Repeat c/s in 2 yrs.   2. Decrease caffeine.   3. F/u 1 yrs.     Return in about 1 year (around 3/31/2022).    Shaq Quiñones MD  3/31/2021

## 2021-04-12 RX ORDER — CARVEDILOL 6.25 MG/1
TABLET ORAL
Qty: 180 TABLET | Refills: 3 | Status: SHIPPED | OUTPATIENT
Start: 2021-04-12 | End: 2021-04-14

## 2021-04-14 DIAGNOSIS — K21.9 GASTROESOPHAGEAL REFLUX DISEASE, UNSPECIFIED WHETHER ESOPHAGITIS PRESENT: ICD-10-CM

## 2021-04-14 RX ORDER — PANTOPRAZOLE SODIUM 40 MG/1
40 TABLET, DELAYED RELEASE ORAL DAILY
Qty: 90 TABLET | Refills: 3 | Status: SHIPPED | OUTPATIENT
Start: 2021-04-14 | End: 2022-07-20 | Stop reason: SDUPTHER

## 2021-04-14 RX ORDER — CARVEDILOL 6.25 MG/1
TABLET ORAL
Qty: 60 TABLET | Refills: 2 | Status: SHIPPED | OUTPATIENT
Start: 2021-04-14 | End: 2022-09-06

## 2021-04-14 RX ORDER — NITROGLYCERIN 0.4 MG/1
TABLET SUBLINGUAL
Qty: 25 TABLET | Refills: 3 | Status: SHIPPED | OUTPATIENT
Start: 2021-04-14

## 2021-04-14 RX ORDER — CLOPIDOGREL BISULFATE 75 MG/1
75 TABLET ORAL DAILY
Qty: 90 TABLET | Refills: 2 | Status: SHIPPED | OUTPATIENT
Start: 2021-04-14 | End: 2022-06-07

## 2021-04-14 RX ORDER — ATORVASTATIN CALCIUM 40 MG/1
40 TABLET, FILM COATED ORAL DAILY
Qty: 90 TABLET | Refills: 2 | Status: SHIPPED | OUTPATIENT
Start: 2021-04-14 | End: 2022-03-09

## 2021-04-14 RX ORDER — LISINOPRIL 5 MG/1
5 TABLET ORAL DAILY
Qty: 90 TABLET | Refills: 2 | Status: SHIPPED | OUTPATIENT
Start: 2021-04-14 | End: 2022-05-02

## 2021-04-22 ENCOUNTER — TELEPHONE (OUTPATIENT)
Dept: GASTROENTEROLOGY | Facility: CLINIC | Age: 66
End: 2021-04-22

## 2021-11-09 NOTE — PROGRESS NOTES
Expected Date Of Service: 11/09/2021 Subjective   Khai Slater is a 64 y.o. male.     Follow-up essential hypertension presently controlled  CAD, stable just saw cardiology hyperlipidemia  He had been off his cholesterol medicine for some time needs refill  Cholesterol elevated see lab mixed hyperlipidemia    No problems with cholesterol medication he has chronically elevated liver function test which have been stable  Liver biopsy several years ago  Just had a CT of his abdomen and chest with urology as he has a history of testicular cancer but he is having no complaints  I think he gets this every year to  I requested office visit notes and studies  We discussed vascular screenings carotid PAD lower extremities    He has some right knee pain arthritis not able to run use the stair climber but not exercising frequently presently  Tries to eat healthy improved his diet has lost some weight recently    Colonoscopy approximately 5 years ago some polyps      Hypertension   Pertinent negatives include no chest pain, palpitations or shortness of breath.        The following portions of the patient's history were reviewed and updated as appropriate: allergies, current medications, past family history, past medical history, past social history, past surgical history and problem list.    Review of Systems   Constitutional: Negative for fatigue and fever.   HENT: Negative.  Negative for trouble swallowing.    Eyes: Negative.    Respiratory: Negative.  Negative for cough and shortness of breath.    Cardiovascular: Negative for chest pain, palpitations and leg swelling.   Gastrointestinal: Negative.  Negative for abdominal pain.   Genitourinary: Negative.    Musculoskeletal: Negative.    Skin: Negative.    Neurological: Negative.  Negative for dizziness and confusion.   Psychiatric/Behavioral: Negative.        Objective   Physical Exam   Constitutional: He is oriented to person, place, and time. He appears well-developed and well-nourished. No distress.    HENT:   Head: Normocephalic and atraumatic.   Nose: Nose normal.   Mouth/Throat: Oropharynx is clear and moist.   Eyes: Conjunctivae are normal. Pupils are equal, round, and reactive to light.   Neck: Neck supple. No JVD present.   Cardiovascular: Normal rate, regular rhythm and normal heart sounds.   No murmur heard.  Carotids clear   Pulmonary/Chest: Effort normal and breath sounds normal. No respiratory distress. He has no wheezes.   Abdominal: Soft. Bowel sounds are normal. He exhibits no distension and no mass. There is no tenderness. There is no guarding. No hernia.   Musculoskeletal: He exhibits no edema or tenderness.   Lymphadenopathy:     He has no cervical adenopathy.   Neurological: He is alert and oriented to person, place, and time.   Skin: Skin is warm and dry. He is not diaphoretic.   Psychiatric: He has a normal mood and affect. His behavior is normal. Judgment and thought content normal.   Vitals reviewed.        Assessment/Plan   Khai was seen today for hypertension.    Diagnoses and all orders for this visit:    Screen for colon cancer  -     Ambulatory Referral For Screening Colonoscopy  -     CBC & Differential  -     Basic Metabolic Panel  -     TSH Rfx On Abnormal To Free T4    Essential hypertension  -     CBC & Differential  -     Basic Metabolic Panel  -     TSH Rfx On Abnormal To Free T4    Mixed hyperlipidemia  -     CBC & Differential  -     Basic Metabolic Panel  -     TSH Rfx On Abnormal To Free T4    Coronary artery disease involving coronary bypass graft of native heart without angina pectoris  -     CBC & Differential  -     Basic Metabolic Panel  -     TSH Rfx On Abnormal To Free T4    Other orders  -     nitroglycerin (NITROSTAT) 0.4 MG SL tablet; 1 under the tongue as needed for angina, may repeat q5mins for up three doses call 911 if not resolved        Discussed the long term known safety of statins they are safe for the liver  He has no contraindications to statin his  Performing Laboratory: -[address] [facility]: no liver function test are mildly elevated despite being off the statin  If these significantly rise and that would prompt a further work-up including ultrasound    Need more frequent lab return office in 6 months labs in 6 months healthy diet regular exercise target numbers as discussed blood pressure less than 130 LDL less than 70                There are no Patient Instructions on file for this visit.                Please locate Dr[facility] Castillo [facility]  Last consultation copy office note as well as  Labs x1 year  Recent I believe CT of the chest and abdomen or any recent radiological studies thank you   Billing Type: Third-Party Bill

## 2022-02-25 ENCOUNTER — OFFICE VISIT (OUTPATIENT)
Dept: INTERNAL MEDICINE | Facility: CLINIC | Age: 67
End: 2022-02-25

## 2022-02-25 VITALS
BODY MASS INDEX: 28.59 KG/M2 | SYSTOLIC BLOOD PRESSURE: 118 MMHG | WEIGHT: 193.6 LBS | DIASTOLIC BLOOD PRESSURE: 72 MMHG | OXYGEN SATURATION: 99 % | HEART RATE: 65 BPM | TEMPERATURE: 97.3 F

## 2022-02-25 DIAGNOSIS — Z95.1 HX OF CABG: ICD-10-CM

## 2022-02-25 DIAGNOSIS — E78.2 MIXED HYPERLIPIDEMIA: ICD-10-CM

## 2022-02-25 DIAGNOSIS — N48.6 PEYRONIE'S DISEASE: ICD-10-CM

## 2022-02-25 DIAGNOSIS — Z98.1 HISTORY OF FUSION OF CERVICAL SPINE: ICD-10-CM

## 2022-02-25 DIAGNOSIS — Z23 NEEDS FLU SHOT: ICD-10-CM

## 2022-02-25 DIAGNOSIS — I25.810 CORONARY ARTERY DISEASE INVOLVING CORONARY BYPASS GRAFT OF NATIVE HEART WITHOUT ANGINA PECTORIS: ICD-10-CM

## 2022-02-25 DIAGNOSIS — H11.002 PTERYGIUM OF LEFT EYE: ICD-10-CM

## 2022-02-25 DIAGNOSIS — I10 PRIMARY HYPERTENSION: ICD-10-CM

## 2022-02-25 DIAGNOSIS — Z85.47 HISTORY OF TESTICULAR CANCER: ICD-10-CM

## 2022-02-25 DIAGNOSIS — Z23 NEED FOR 23-POLYVALENT PNEUMOCOCCAL POLYSACCHARIDE VACCINE: ICD-10-CM

## 2022-02-25 DIAGNOSIS — Z83.71 FAMILY HISTORY OF COLONIC POLYPS: ICD-10-CM

## 2022-02-25 DIAGNOSIS — R73.09 ELEVATED GLUCOSE: ICD-10-CM

## 2022-02-25 DIAGNOSIS — Z00.00 ENCOUNTER FOR SUBSEQUENT ANNUAL WELLNESS VISIT (AWV) IN MEDICARE PATIENT: Primary | ICD-10-CM

## 2022-02-25 DIAGNOSIS — R10.13 DYSPEPSIA: ICD-10-CM

## 2022-02-25 PROCEDURE — 1126F AMNT PAIN NOTED NONE PRSNT: CPT | Performed by: FAMILY MEDICINE

## 2022-02-25 PROCEDURE — G0439 PPPS, SUBSEQ VISIT: HCPCS | Performed by: FAMILY MEDICINE

## 2022-02-25 PROCEDURE — 90732 PPSV23 VACC 2 YRS+ SUBQ/IM: CPT | Performed by: FAMILY MEDICINE

## 2022-02-25 PROCEDURE — G0008 ADMIN INFLUENZA VIRUS VAC: HCPCS | Performed by: FAMILY MEDICINE

## 2022-02-25 PROCEDURE — 1159F MED LIST DOCD IN RCRD: CPT | Performed by: FAMILY MEDICINE

## 2022-02-25 PROCEDURE — 1170F FXNL STATUS ASSESSED: CPT | Performed by: FAMILY MEDICINE

## 2022-02-25 PROCEDURE — 90662 IIV NO PRSV INCREASED AG IM: CPT | Performed by: FAMILY MEDICINE

## 2022-02-25 PROCEDURE — G0009 ADMIN PNEUMOCOCCAL VACCINE: HCPCS | Performed by: FAMILY MEDICINE

## 2022-02-25 NOTE — PROGRESS NOTES
Date of Encounter: 2022  Patient: Khai Slater,  1955    SUBSEQUENT MEDICARE WELLNESS VISIT  Subjective   History of Presenting Illness  Chief complaint: Medicare wellness visit, establish care    No acute complaints.    Coronary artery disease status post PCI and CABG, he has developed good collaterals and has not had any angina.  He follows with Dr. Law.     Hypertension well-controlled on lisinopril and carvedilol.    Hyperlipidemia tolerating statin.    Dyspepsia controlled on pantoprazole with EGD showing [place] grade a esophagitis .  Family history of colon polyps and personal history of polyps with 2-year interval since C-scope .    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    Pterygium of the left eye not causing any visual difficulties.    History of testicular cancer status post orchiectomy, follows with urology annually.    History of fusion of cervical spine with no pain or sequela.    Distant history of hepatitis when in the , biopsy was negative, may been related to hydrazine exposure.    Lives with wife.  3 children, new grandchild just born.  1 pack year former smoker.  Minimal alcohol use.  Exercises using a treadmill 1-2 times per week.  Using the whole 30 diet for weight loss.  Colon cancer screening  with 2-year interval, PSAs through urology.  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  Has a living will not on file.  Due for PPSV23, influenza vaccination, Shingrix at his leisure.  He is up-to-date on all 3 COVID-19 vaccinations.    Pain  In the past 7 days, how much pain have you felt? None    Review of Systems:  Negative for fever, congestion, chest pain upon exertion, shortness of breath, vision changes, vomiting, dysuria, lymphadenopathy, muscle weakness, numbness, mood changes, rashes.    Health Risk Assessment:    Recent Hospitalizations:  No hospitalization(s) within the last year.    Current  Medical Providers:  Patient Care Team:  Janak Wills MD as PCP - General (Family Medicine)    Smoking Status:  Social History     Tobacco Use   Smoking Status Never Smoker   Smokeless Tobacco Never Used   Tobacco Comment    CAFFEINE USE       Alcohol Consumption:  Social History     Substance and Sexual Activity   Alcohol Use Yes   • Alcohol/week: 1.0 standard drink   • Types: 1 Cans of beer per week    Comment: occassionally       Depression Screen:   PHQ-2/PHQ-9 Depression Screening 2/25/2022   Little interest or pleasure in doing things 0   Feeling down, depressed, or hopeless 0   Total Score 0     I spent more than 16 minutes asking patient questions, counseling and documenting in the chart    Fall Risk Screen:  ARSENIO Fall Risk Assessment was completed, and patient is at LOW risk for falls.Assessment completed on:2/25/2022    Health Habits and Functional and Cognitive Screening:  Functional & Cognitive Status 2/25/2022   Do you have difficulty preparing food and eating? No   Do you have difficulty bathing yourself, getting dressed or grooming yourself? No   Do you have difficulty using the toilet? No   Do you have difficulty moving around from place to place? No   Do you have trouble with steps or getting out of a bed or a chair? No   Do you need help using the phone?  No   Are you deaf or do you have serious difficulty hearing?  Yes   Do you need help with transportation? No   Do you need help shopping? No   Do you need help preparing meals?  No   Do you need help with housework?  No   Do you need help with laundry? No   Do you need help taking your medications? No   Do you need help managing money? No   Do you ever drive or ride in a car without wearing a seat belt? No   Do you have difficulty concentrating, remembering or making decisions? No       Does the patient have evidence of cognitive impairment? No    Aspirin use counseling:On clopidogrel instead per cardiology    Recent Lab Results:         Age-appropriate Screening Schedule:  Refer to the list below for future screening recommendations based on patient's age, sex and/or medical conditions. Orders for these recommended tests are listed in the plan section. The patient has been provided with a written plan.    Health Maintenance   Topic Date Due   • ZOSTER VACCINE (1 of 2) Never done   • LIPID PANEL  09/30/2020   • INFLUENZA VACCINE  Never done   • TDAP/TD VACCINES (2 - Td or Tdap) 10/01/2027       Compared to one year ago, the patient feels his physical health is the same.  Compared to one year ago, the patient feels his mental health is the same.    Reviewed chart for potential of high risk medication in the elderly: yes  Reviewed chart for potential of harmful drug interactions in the elderly:yes    Advanced Care Planning:  Advance Care Planning   ACP discussion was held with the patient during this visit. Patient has an advance directive (not in EMR), copy requested.    The following portions of the patient's history were reviewed and updated as appropriate: allergies, current medications, past family history, past medical history, past social history, past surgical history and problem list.    Patient Active Problem List   Diagnosis   • Hyperlipemia   • History of hepatitis   • Coronary artery disease involving coronary bypass graft of native heart without angina pectoris   • Hypertension   • Elevated glucose   • Dyspepsia   • Colon polyp   • Family history of colonic polyps   • Pterygium of left eye   • Hx of CABG   • Peyronie's disease   • History of testicular cancer   • History of fusion of cervical spine     Past Medical History:   Diagnosis Date   • Acute non-ST-elevation myocardial infarction (HCC) 3/4/2016    Description: 7/07 tx with Taxus stent to dis circ   • CAD (coronary artery disease)    • Cancer (HCC)     testicular   • Coronary arteriosclerosis in native artery 3/4/2016    Description: due to FAA cath in 1/08 restenosis in circ  and new lesion in LAD tx with cypher stents; 9/12 no sx but due to FAA relooked and CABG LIMA to LAD and SVG to circ by Dr Goode; 5/13 recath no sx and LIMA atretic and SVG to circ gone   • GERD (gastroesophageal reflux disease)    • Hyperlipidemia    • Hypertension    • NSTEMI (non-ST elevated myocardial infarction) (HCC)      Past Surgical History:   Procedure Laterality Date   • CARDIAC CATHETERIZATION     • COLONOSCOPY     • COLONOSCOPY N/A 3/24/2021    Procedure: COLONOSCOPY INTO CECUM AND T.I WITH SALINE LIFT COLD SNARE POLYPECTOMY  WITH CLIP PLACEMENT X1 IN CECUM AND COLD BIOPSY POLYPECTOMIES;  Surgeon: Shaq Quiñones MD;  Location: [facility] ENDOSCOPY;  Service: Gastroenterology;  Laterality: N/A;  PRE- HISTORY OF COLON POLYPS, PERSONAL HISTORY OF COLON POLYPS  POST- POLYPS, INTERNAL HEMORRHOIDS   • CORONARY ANGIOPLASTY WITH STENT PLACEMENT      5/2/2013; 70% stenosis in the LIMA to the LAD, % occluded, proximal LAD has 90% in stent stenosis, 100% distal left circumflex, 20% diffuse disease of the RCA.   • CORONARY ARTERY BYPASS GRAFT     • ENDOSCOPY N/A 3/24/2021    Procedure: ESOPHAGOGASTRODUODENOSCOPY WITH COLD BIOPSIES;  Surgeon: Shaq Quiñones MD;  Location: [facility] ENDOSCOPY;  Service: Gastroenterology;  Laterality: N/A;  PRE- DYSPEPSIA  POST- ESOPHAGITIS, GASTRITIS   • ORCHIECTOMY Left      Family History   Problem Relation Age of Onset   • Heart disease Mother    • Heart attack Mother    • Hypertension Father    • No Known Problems Maternal Grandmother    • Diabetes Maternal Grandfather    • No Known Problems Paternal Grandmother    • No Known Problems Paternal Grandfather        Current Outpatient Medications:   •  atorvastatin (LIPITOR) 40 MG tablet, Take 1 tablet by mouth Daily., Disp: 90 tablet, Rfl: 2  •  carvedilol (COREG) 6.25 MG tablet, TAKE ONE TABLET BY MOUTH TWICE A DAY, Disp: 60 tablet, Rfl: 2  •  clopidogrel (PLAVIX) 75 MG tablet, Take 1 tablet by mouth Daily., Disp: 90 tablet, Rfl:  2  •  Coenzyme Q10 (CO Q 10 PO), Take 1 tablet by mouth Daily., Disp: , Rfl:   •  lisinopril (PRINIVIL,ZESTRIL) 5 MG tablet, Take 1 tablet by mouth Daily., Disp: 90 tablet, Rfl: 2  •  nitroglycerin (NITROSTAT) 0.4 MG SL tablet, 1 under the tongue as needed for angina, may repeat q5mins for up three doses call 911 if not resolved, Disp: 25 tablet, Rfl: 3  •  pantoprazole (PROTONIX) 40 MG EC tablet, Take 1 tablet by mouth Daily., Disp: 90 tablet, Rfl: 3  No Known Allergies  Social History     Tobacco Use   • Smoking status: Never Smoker   • Smokeless tobacco: Never Used   • Tobacco comment: CAFFEINE USE   Substance Use Topics   • Alcohol use: Yes     Alcohol/week: 1.0 standard drink     Types: 1 Cans of beer per week     Comment: occassionally   • Drug use: No          Objective   Physical Exam  Vitals:    02/25/22 0956   BP: 118/72   Pulse: 65   Temp: 97.3 °F (36.3 °C)   TempSrc: Temporal   SpO2: 99%   Weight: 87.8 kg (193 lb 9.6 oz)     Body mass index is 28.59 kg/m².  Discussed the patient's BMI with him. The BMI Is above average, on a diet to lose weight.    Constitutional: NAD.  Eyes: EOMI. PERRLA. Normal conjunctiva.  Ear, nose, mouth, throat: No tonsillar exudates or erythema.   Normal nasal mucosa. Normal external ear canals and TMs bilaterally.  Cardiovascular: RRR. No murmurs. No LE edema b/l. Radial pulses 2+ bilaterally.  Pulmonary: CTA b/l. Good effort.  Integumentary: No lacerations or wounds on face or upper extremities.  Lymphatic: No anterior cervical lymphadenopathy.  Endocrine: No thyromegaly or palpable thyroid nodules.  Psychiatric: Normal affect. Normal thought content.       Assessment/Plan   Assessment and Plan  Very pleasant 66-year-old male with coronary artery disease status post CABG, testicular cancer status post orchiectomy, dyspepsia with history of colon polyps, hypertension, hyperlipidemia, history of cervical fusion, among other problems, who presents with the following:    Advance  Directive Discussion  Colon Cancer Screening  Immunizations Discussed/Encouraged (specific immunizations; Influenza and Pneumococcal 23 )  Obesity/Overweight     The above risks/problems have been discussed with the patient.  Pertinent information has been shared with the patient in the After Visit Summary.  Other plans as below:    Diagnoses and all orders for this visit:    1. Encounter for subsequent annual wellness visit (AWV) in Medicare patient (Primary): We discussed preventative care including age and patient-appropriate immunizations and cancer screening. We also discussed the importance of exercise and a healthy diet. This is their annual preventative exam.    2. Coronary artery disease involving coronary bypass graft of native heart without angina pectoris  3. Hx of CABG: No angina, continues to follow with cardiology    4. Primary hypertension: Controlled  -     Urinalysis With Microscopic - Urine, Clean Catch    5. Mixed hyperlipidemia  -     Lipid Panel  -     Comprehensive Metabolic Panel  -     CBC & Differential  -     Thyroid Panel With TSH    6. Dyspepsia: Controlled with recent EGD 2021 showing grade a esophagitis    7. Pterygium of left eye: No vision symptoms    8. Peyronie's disease: Did not improve with injections    9. Family history of colonic polyps: Due for repeat in 2 years from 2021    10. History of testicular cancer: Follows with urology annually gets PSAs there as well    11. History of fusion of cervical spine: No sequela    12. Elevated glucose  -     Hemoglobin A1c    13. Need for 23-polyvalent pneumococcal polysaccharide vaccine  -     Pneumococcal Polysaccharide Vaccine 23-Valent (PPSV23) Greater Than or Equal To 1yo Subcutaneous / IM    14. Needs flu shot  -     FluLaval/Fluarix/Fluzone >6 Months (2816-2534)    Follow Up:  Return to office in 1 year for annual physical or earlier as needed.    An After Visit Summary and PPPS were given to the patient.      Janak Wills  MD  Family Medicine  O: [phone]  C: [phone]    Disclaimer: Parts of this note were dictated by speech recognition. Minor errors in transcription may be present. Please call if questions.

## 2022-02-26 LAB
ALBUMIN SERPL-MCNC: 4.6 G/DL (ref 3.8–4.8)
ALBUMIN/GLOB SERPL: 1.9 {RATIO} (ref 1.2–2.2)
ALP SERPL-CCNC: 112 IU/L (ref 44–121)
ALT SERPL-CCNC: 56 IU/L (ref 0–44)
APPEARANCE UR: CLEAR
AST SERPL-CCNC: 43 IU/L (ref 0–40)
BACTERIA #/AREA URNS HPF: ABNORMAL /[HPF]
BASOPHILS # BLD AUTO: 0.1 X10E3/UL (ref 0–0.2)
BASOPHILS NFR BLD AUTO: 2 %
BILIRUB SERPL-MCNC: 0.6 MG/DL (ref 0–1.2)
BILIRUB UR QL STRIP: NEGATIVE
BUN SERPL-MCNC: 18 MG/DL (ref 8–27)
BUN/CREAT SERPL: 18 (ref 10–24)
CALCIUM SERPL-MCNC: 9.6 MG/DL (ref 8.6–10.2)
CASTS URNS QL MICRO: ABNORMAL /LPF
CHLORIDE SERPL-SCNC: 104 MMOL/L (ref 96–106)
CHOLEST SERPL-MCNC: 137 MG/DL (ref 100–199)
CO2 SERPL-SCNC: 22 MMOL/L (ref 20–29)
COLOR UR: YELLOW
CREAT SERPL-MCNC: 0.99 MG/DL (ref 0.76–1.27)
CRYSTALS URNS MICRO: ABNORMAL
EOSINOPHIL # BLD AUTO: 0.5 X10E3/UL (ref 0–0.4)
EOSINOPHIL NFR BLD AUTO: 10 %
EPI CELLS #/AREA URNS HPF: ABNORMAL /HPF (ref 0–10)
ERYTHROCYTE [DISTWIDTH] IN BLOOD BY AUTOMATED COUNT: 12.1 % (ref 11.6–15.4)
FT4I SERPL CALC-MCNC: 1.9 (ref 1.2–4.9)
GLOBULIN SER CALC-MCNC: 2.4 G/DL (ref 1.5–4.5)
GLUCOSE SERPL-MCNC: 97 MG/DL (ref 65–99)
GLUCOSE UR QL STRIP: NEGATIVE
HBA1C MFR BLD: 5.7 % (ref 4.8–5.6)
HCT VFR BLD AUTO: 47.4 % (ref 37.5–51)
HDLC SERPL-MCNC: 49 MG/DL
HGB BLD-MCNC: 16.2 G/DL (ref 13–17.7)
HGB UR QL STRIP: NEGATIVE
IMM GRANULOCYTES # BLD AUTO: 0 X10E3/UL (ref 0–0.1)
IMM GRANULOCYTES NFR BLD AUTO: 0 %
KETONES UR QL STRIP: NEGATIVE
LDLC SERPL CALC-MCNC: 74 MG/DL (ref 0–99)
LEUKOCYTE ESTERASE UR QL STRIP: NEGATIVE
LYMPHOCYTES # BLD AUTO: 1.6 X10E3/UL (ref 0.7–3.1)
LYMPHOCYTES NFR BLD AUTO: 32 %
MCH RBC QN AUTO: 30.2 PG (ref 26.6–33)
MCHC RBC AUTO-ENTMCNC: 34.2 G/DL (ref 31.5–35.7)
MCV RBC AUTO: 88 FL (ref 79–97)
MICRO URNS: NORMAL
MICRO URNS: NORMAL
MONOCYTES # BLD AUTO: 0.6 X10E3/UL (ref 0.1–0.9)
MONOCYTES NFR BLD AUTO: 11 %
MUCOUS THREADS URNS QL MICRO: PRESENT
NEUTROPHILS # BLD AUTO: 2.3 X10E3/UL (ref 1.4–7)
NEUTROPHILS NFR BLD AUTO: 45 %
NITRITE UR QL STRIP: NEGATIVE
PH UR STRIP: 5 [PH] (ref 5–7.5)
PLATELET # BLD AUTO: 220 X10E3/UL (ref 150–450)
POTASSIUM SERPL-SCNC: 5 MMOL/L (ref 3.5–5.2)
PROT SERPL-MCNC: 7 G/DL (ref 6–8.5)
PROT UR QL STRIP: NEGATIVE
RBC # BLD AUTO: 5.36 X10E6/UL (ref 4.14–5.8)
RBC #/AREA URNS HPF: ABNORMAL /HPF (ref 0–2)
SODIUM SERPL-SCNC: 141 MMOL/L (ref 134–144)
SP GR UR STRIP: 1.02 (ref 1–1.03)
T3RU NFR SERPL: 26 % (ref 24–39)
T4 SERPL-MCNC: 7.2 UG/DL (ref 4.5–12)
TRIGL SERPL-MCNC: 66 MG/DL (ref 0–149)
TSH SERPL DL<=0.005 MIU/L-ACNC: 3.63 UIU/ML (ref 0.45–4.5)
UNIDENT CRYS URNS QL MICRO: PRESENT
UROBILINOGEN UR STRIP-MCNC: 0.2 MG/DL (ref 0.2–1)
VLDLC SERPL CALC-MCNC: 14 MG/DL (ref 5–40)
WBC # BLD AUTO: 5.1 X10E3/UL (ref 3.4–10.8)
WBC #/AREA URNS HPF: ABNORMAL /HPF (ref 0–5)

## 2022-02-28 PROBLEM — R73.03 PREDIABETES: Status: ACTIVE | Noted: 2018-08-29

## 2022-03-01 ENCOUNTER — TELEPHONE (OUTPATIENT)
Dept: INTERNAL MEDICINE | Facility: CLINIC | Age: 67
End: 2022-03-01

## 2022-03-01 NOTE — TELEPHONE ENCOUNTER
----- Message from Janak Wills MD sent at 2/28/2022  9:30 PM EST -----  Please call the patient about their results with the following discussion points:    2 main things with your blood work.  First, your cholesterol has improved significantly with the cholesterol medication.  This has caused a very mild increase in your liver function tests but this is very common and does usually not cause any problems, we will continue to monitor it regularly.    Unfortunately you are prediabetic.  Please be careful with your sugar intake and hopefully this will improve with your diet and weight loss.

## 2022-03-09 RX ORDER — ATORVASTATIN CALCIUM 40 MG/1
TABLET, FILM COATED ORAL
Qty: 90 TABLET | Refills: 3 | Status: SHIPPED | OUTPATIENT
Start: 2022-03-09

## 2022-05-02 ENCOUNTER — TELEPHONE (OUTPATIENT)
Dept: CARDIOLOGY | Facility: CLINIC | Age: 67
End: 2022-05-02

## 2022-05-02 RX ORDER — LISINOPRIL 5 MG/1
TABLET ORAL
Qty: 30 TABLET | Refills: 0 | Status: SHIPPED | OUTPATIENT
Start: 2022-05-02 | End: 2022-06-07

## 2022-05-09 ENCOUNTER — OFFICE VISIT (OUTPATIENT)
Dept: CARDIOLOGY | Facility: CLINIC | Age: 67
End: 2022-05-09

## 2022-05-09 VITALS
HEART RATE: 52 BPM | SYSTOLIC BLOOD PRESSURE: 118 MMHG | DIASTOLIC BLOOD PRESSURE: 80 MMHG | HEIGHT: 69 IN | WEIGHT: 194.8 LBS | BODY MASS INDEX: 28.85 KG/M2

## 2022-05-09 DIAGNOSIS — I25.810 CORONARY ARTERY DISEASE INVOLVING CORONARY BYPASS GRAFT OF NATIVE HEART WITHOUT ANGINA PECTORIS: Primary | ICD-10-CM

## 2022-05-09 DIAGNOSIS — I10 PRIMARY HYPERTENSION: ICD-10-CM

## 2022-05-09 DIAGNOSIS — Z95.1 HX OF CABG: ICD-10-CM

## 2022-05-09 DIAGNOSIS — E78.2 MIXED HYPERLIPIDEMIA: ICD-10-CM

## 2022-05-09 PROCEDURE — 93000 ELECTROCARDIOGRAM COMPLETE: CPT | Performed by: NURSE PRACTITIONER

## 2022-05-09 PROCEDURE — 99214 OFFICE O/P EST MOD 30 MIN: CPT | Performed by: NURSE PRACTITIONER

## 2022-05-09 NOTE — PROGRESS NOTES
Date of Office Visit: 2022  Encounter Provider: CRISTÓBAL Blevins  Place of Service: [facility] CARDIOLOGY  Patient Name: Khai Slater  :1955    Chief Complaint   Patient presents with   • Coronary Artery Disease   :     HPI: Khai Slater is a 67 y.o. male.  He is a patient of Dr. Law's whom we follow for the management of hypertension, hyperlipidemia, and coronary artery disease.  In , he had a non-STEMI and underwent PCI of the distal circumflex.  In , he developed restenosis of the circumflex and a new lesion in the LAD for which he underwent PCI.  In , he underwent a repeat cardiac catheterization due to [facility] regulations.  Ultimately, he was referred for a bypass and underwent a CABG x 2.  In , he underwent repeat cardiac catheterization which demonstrated a 70% lesion of the LAD, occluded SVG to the OM, and 100% occlusion of the distal circumflex.  Stress test at that time demonstrated a small area of anterior apical ischemia.  Ultimately, he was grounded from flying and we have been managing him medically.   He was last seen in the office by Dr. Law in  at which time he was doing well with no complaints of angina or heart failure.  Dr. Law stopped the aspirin with plans to keep him on long-term clopidogrel.  Otherwise, he was advised follow-up in 1 year.  Dr. Law noted that we would repeat a stress test in a year.  If the defect was a lot different, we would probably be forced to proceed with a repeat catheterization.  Stress test from 2020 demonstrated no evidence of ischemia.   He has been doing well.  He denies any chest pain, shortness of breath, palpitations, edema, dizziness, syncope, bleeding difficulties or melena.  He worked in the yard all day yesterday without issue.  He has 8 grandchildren and has travel plans to visit them all this summer.    Past Medical History:   Diagnosis Date   • Acute  non-ST-elevation myocardial infarction (HCC) 3/4/2016    Description: 7/07 tx with Taxus stent to dis circ   • CAD (coronary artery disease)    • Cancer (HCC)     testicular   • Coronary arteriosclerosis in native artery 3/4/2016    Description: due to FAA cath in 1/08 restenosis in circ and new lesion in LAD tx with cypher stents; 9/12 no sx but due to FAA relooked and CABG LIMA to LAD and SVG to circ by Dr Goode; 5/13 recath no sx and LIMA atretic and SVG to circ gone   • GERD (gastroesophageal reflux disease)    • Hyperlipidemia    • Hypertension    • NSTEMI (non-ST elevated myocardial infarction) ([facility])        Past Surgical History:   Procedure Laterality Date   • CARDIAC CATHETERIZATION     • COLONOSCOPY     • COLONOSCOPY N/A 3/24/2021    Procedure: COLONOSCOPY INTO CECUM AND T.I WITH SALINE LIFT COLD SNARE POLYPECTOMY  WITH CLIP PLACEMENT X1 IN CECUM AND COLD BIOPSY POLYPECTOMIES;  Surgeon: Shaq Quiñones MD;  Location: [facility] ENDOSCOPY;  Service: Gastroenterology;  Laterality: N/A;  PRE- HISTORY OF COLON POLYPS, PERSONAL HISTORY OF COLON POLYPS  POST- POLYPS, INTERNAL HEMORRHOIDS   • CORONARY ANGIOPLASTY WITH STENT PLACEMENT      5/2/2013; 70% stenosis in the LIMA to the LAD, % occluded, proximal LAD has 90% in stent stenosis, 100% distal left circumflex, 20% diffuse disease of the RCA.   • CORONARY ARTERY BYPASS GRAFT     • ENDOSCOPY N/A 3/24/2021    Procedure: ESOPHAGOGASTRODUODENOSCOPY WITH COLD BIOPSIES;  Surgeon: Shaq Quiñones MD;  Location: [facility] ENDOSCOPY;  Service: Gastroenterology;  Laterality: N/A;  PRE- DYSPEPSIA  POST- ESOPHAGITIS, GASTRITIS   • ORCHIECTOMY Left        Social History     Socioeconomic History   • Marital status:    Tobacco Use   • Smoking status: Never Smoker   • Smokeless tobacco: Never Used   • Tobacco comment: CAFFEINE USE   Substance and Sexual Activity   • Alcohol use: Yes     Alcohol/week: 1.0 standard drink     Types: 1 Cans of beer per week     Comment:  "occassionally   • Drug use: No   • Sexual activity: Defer       Family History   Problem Relation Age of Onset   • Heart disease Mother    • Heart attack Mother    • Hypertension Father    • No Known Problems Maternal Grandmother    • Diabetes Maternal Grandfather    • No Known Problems Paternal Grandmother    • No Known Problems Paternal Grandfather        Review of Systems   Constitutional: Negative.   Cardiovascular: Negative.  Negative for chest pain, dyspnea on exertion, leg swelling, orthopnea, paroxysmal nocturnal dyspnea and syncope.   Respiratory: Negative.    Hematologic/Lymphatic: Negative for bleeding problem.   Musculoskeletal: Negative for falls.   Gastrointestinal: Negative for melena.   Neurological: Negative for dizziness and light-headedness.       No Known Allergies      Current Outpatient Medications:   •  atorvastatin (LIPITOR) 40 MG tablet, TAKE 1 TABLET DAILY, Disp: 90 tablet, Rfl: 3  •  carvedilol (COREG) 6.25 MG tablet, TAKE ONE TABLET BY MOUTH TWICE A DAY, Disp: 60 tablet, Rfl: 2  •  clopidogrel (PLAVIX) 75 MG tablet, Take 1 tablet by mouth Daily., Disp: 90 tablet, Rfl: 2  •  Coenzyme Q10 (CO Q 10 PO), Take 1 tablet by mouth Daily., Disp: , Rfl:   •  lisinopril (PRINIVIL,ZESTRIL) 5 MG tablet, TAKE 1 TABLET DAILY, Disp: 30 tablet, Rfl: 0  •  nitroglycerin (NITROSTAT) 0.4 MG SL tablet, 1 under the tongue as needed for angina, may repeat q5mins for up three doses call 911 if not resolved, Disp: 25 tablet, Rfl: 3  •  pantoprazole (PROTONIX) 40 MG EC tablet, Take 1 tablet by mouth Daily., Disp: 90 tablet, Rfl: 3      Objective:     Vitals:    05/09/22 1019   BP: 118/80   Pulse: 52   Weight: 88.4 kg (194 lb 12.8 oz)   Height: 175.3 cm (69\")     Body mass index is 28.77 kg/m².    PHYSICAL EXAM:    Neck:      Vascular: No JVD.   Pulmonary:      Effort: Pulmonary effort is normal.      Breath sounds: Normal breath sounds.   Cardiovascular:      Normal rate. Regular rhythm.      Murmurs: There is no " murmur.      No gallop. No click. No rub.   Pulses:     Intact distal pulses.           ECG 12 Lead    Date/Time: 5/9/2022 10:25 AM  Performed by: Melany Lowry APRN  Authorized by: Melany Lowry APRN   Comparison: compared with previous ECG from 9/27/2019  Rhythm: sinus bradycardia  Rate: bradycardic  BPM: 52  Comments: Indication: CAD              Assessment:       Diagnosis Plan   1. Coronary artery disease involving coronary bypass graft of native heart without angina pectoris  ECG 12 Lead   2. Hx of CABG     3. Primary hypertension     4. Mixed hyperlipidemia       Orders Placed This Encounter   Procedures   • ECG 12 Lead     This order was created via procedure documentation     Order Specific Question:   Release to patient     Answer:   Immediate          Plan:       1.  Coronary artery disease.  History of CABG x 2 with a LIMA to the LAD and an SVG to the circumflex.  His last cardiac catheterization in 2013 demonstrated a 70% lesion of the LAD, occluded SVG to the OM, and 100% occlusion of the distal circumflex.  He denies any symptoms of angina.  Continue medical therapy including clopidogrel, carvedilol, and atorvastatin.        2.  Hypertension.  His blood pressure looks great.  Continue current regimen including carvedilol and lisinopril.      3.  Hyperlipidemia.  Lipid panel from February demonstrated excellent cholesterol control with an LDL of 74 and HDL of 49.  Continue atorvastatin at current dose.      At the he is doing well.  He denies any symptoms of angina or heart failure.  I am not making any changes today, and he will follow-up with Dr. Law in 1 year.      As always, it has been a pleasure to participate in your patient's care.      Sincerely,         CRISTÓBAL Hooper

## 2022-05-10 ENCOUNTER — TELEPHONE (OUTPATIENT)
Dept: CARDIOLOGY | Facility: CLINIC | Age: 67
End: 2022-05-10

## 2022-05-10 NOTE — TELEPHONE ENCOUNTER
----- Message from Torsten Law MD sent at 5/10/2022  3:59 PM EDT -----  I do not think so Melany if you read my last note he had a stress test was asymptomatic had a small inferior defect I elected to watch him and not do anything we got a follow-up nuclear in 2020 they thought it was normal so I think we can watch him clinically  ----- Message -----  From: Melany Lowry APRN  Sent: 5/9/2022  10:41 AM EDT  To: Torsten Law MD    I saw this shannon today.  From a symptom standpoint, he is doing great.      You last saw him in 2019 and ordered a stress test the following year which he had at the end of 2020.  It was not clear to me why the stress test was ordered.  See someone you want to do periodic stress test on?

## 2022-06-07 RX ORDER — CLOPIDOGREL BISULFATE 75 MG/1
TABLET ORAL
Qty: 90 TABLET | Refills: 1 | Status: SHIPPED | OUTPATIENT
Start: 2022-06-07 | End: 2022-12-30 | Stop reason: SDUPTHER

## 2022-06-07 RX ORDER — LISINOPRIL 5 MG/1
TABLET ORAL
Qty: 90 TABLET | Refills: 1 | Status: SHIPPED | OUTPATIENT
Start: 2022-06-07 | End: 2022-12-30 | Stop reason: SDUPTHER

## 2022-07-03 DIAGNOSIS — K21.9 GASTROESOPHAGEAL REFLUX DISEASE, UNSPECIFIED WHETHER ESOPHAGITIS PRESENT: ICD-10-CM

## 2022-07-05 RX ORDER — PANTOPRAZOLE SODIUM 40 MG/1
TABLET, DELAYED RELEASE ORAL
Qty: 90 TABLET | Refills: 3 | OUTPATIENT
Start: 2022-07-05

## 2022-07-20 DIAGNOSIS — K21.9 GASTROESOPHAGEAL REFLUX DISEASE, UNSPECIFIED WHETHER ESOPHAGITIS PRESENT: ICD-10-CM

## 2022-07-20 RX ORDER — PANTOPRAZOLE SODIUM 40 MG/1
40 TABLET, DELAYED RELEASE ORAL DAILY
Qty: 90 TABLET | Refills: 3 | Status: SHIPPED | OUTPATIENT
Start: 2022-07-20

## 2022-07-20 NOTE — TELEPHONE ENCOUNTER
Caller: [place]    Relationship: Self    Best call back number: [phone] (M)    Requested Prescriptions:   Requested Prescriptions     Pending Prescriptions Disp Refills   • pantoprazole (PROTONIX) 40 MG EC tablet 90 tablet 3     Sig: Take 1 tablet by mouth Daily.        Pharmacy where request should be sent: [facility] [address] [place] [address] AT [facility] [phone] [facility] [phone] FX     Additional details provided by patient: PATIENT IS COMPLETELY OUT AND HAS HEARTBURN.    Does the patient have less than a 3 day supply:  [x] Yes  [] No    Risa Reynolds   07/20/22 08:36 EDT

## 2022-08-12 ENCOUNTER — TELEPHONE (OUTPATIENT)
Dept: INTERNAL MEDICINE | Facility: CLINIC | Age: 67
End: 2022-08-12

## 2022-08-12 NOTE — TELEPHONE ENCOUNTER
Caller: Khai Slater    Relationship: Self    Best call back number: [phone]      What is the best time to reach you: ANYTIME    Who are you requesting to speak with (clinical staff, provider,  specific staff member): MA OR DOCTOR         What was the call regarding: PATIENT IS CALLING IN HE STATES HE TESTED POSITIVE FOR COVID LAST Thursday FEELING MUCH BETTER NO SYMPTOMS BUT IS STILL TESTING POSITIVE AND HE IS NOT SURE WHAT TO DO. WANT TO GO SEE HIS GRANDKIDS BUT HE IN NOT SURE IF HE IS STILL CONTAGIOUS OR WHAT.          Do you require a callback:  YES

## 2022-08-12 NOTE — TELEPHONE ENCOUNTER
If he is still testing positive he should probably stay away from his grandkids, CDC says he could see them if he wore an N95 mask but I think he should just wait until 10 days have passed.

## 2022-08-12 NOTE — TELEPHONE ENCOUNTER
Spoke to pt about providers message, pt understands. He states he is still testing positive. Pt has no further questions at this time.

## 2022-09-06 RX ORDER — CARVEDILOL 6.25 MG/1
TABLET ORAL
Qty: 180 TABLET | Refills: 3 | Status: SHIPPED | OUTPATIENT
Start: 2022-09-06 | End: 2023-03-29 | Stop reason: SDUPTHER

## 2022-11-11 ENCOUNTER — TELEPHONE (OUTPATIENT)
Dept: INTERNAL MEDICINE | Facility: CLINIC | Age: 67
End: 2022-11-11

## 2022-11-11 DIAGNOSIS — R73.03 PREDIABETES: ICD-10-CM

## 2022-11-11 DIAGNOSIS — Z12.5 SCREENING FOR MALIGNANT NEOPLASM OF PROSTATE: ICD-10-CM

## 2022-11-11 DIAGNOSIS — E78.2 MIXED HYPERLIPIDEMIA: Primary | ICD-10-CM

## 2022-11-11 DIAGNOSIS — I10 PRIMARY HYPERTENSION: ICD-10-CM

## 2022-11-11 NOTE — TELEPHONE ENCOUNTER
Pt scheduled for labs on 2/21/23 week before physical.    Please place orders for labs. Pt is also requesting to have his A1C checked in the lab work.    Thank you

## 2022-12-29 ENCOUNTER — DOCUMENTATION (OUTPATIENT)
Dept: GASTROENTEROLOGY | Facility: CLINIC | Age: 67
End: 2022-12-29
Payer: MEDICARE

## 2022-12-30 RX ORDER — CLOPIDOGREL BISULFATE 75 MG/1
75 TABLET ORAL DAILY
Qty: 90 TABLET | Refills: 1 | Status: CANCELLED | OUTPATIENT
Start: 2022-12-30

## 2022-12-30 RX ORDER — LISINOPRIL 5 MG/1
5 TABLET ORAL DAILY
Qty: 90 TABLET | Refills: 3 | Status: SHIPPED | OUTPATIENT
Start: 2022-12-30

## 2022-12-30 RX ORDER — CLOPIDOGREL BISULFATE 75 MG/1
75 TABLET ORAL DAILY
Qty: 90 TABLET | Refills: 3 | Status: SHIPPED | OUTPATIENT
Start: 2022-12-30

## 2022-12-30 NOTE — TELEPHONE ENCOUNTER
Caller: Khai Slater KIRT    Relationship: Self    Best call back number: [phone]    Requested Prescriptions:   Requested Prescriptions     Pending Prescriptions Disp Refills   • clopidogrel (PLAVIX) 75 MG tablet 90 tablet 1     Sig: Take 1 tablet by mouth Daily.   • lisinopril (PRINIVIL,ZESTRIL) 5 MG tablet 90 tablet 1     Sig: Take 1 tablet by mouth Daily.        Pharmacy where request should be sent: [facility] [id number] [address] AT [facility] [phone] [facility] [phone]      Additional details provided by patient: OUT OF MEDICATION     Does the patient have less than a 3 day supply:  [x] Yes  [] No    Would you like a call back once the refill request has been completed: [] Yes [] No    If the office needs to give you a call back, can they leave a voicemail: [] Yes [] No    Risa Teran Rep   12/30/22 08:44 EST

## 2022-12-30 NOTE — TELEPHONE ENCOUNTER
Rx Refill Note  Requested Prescriptions     Pending Prescriptions Disp Refills   • lisinopril (PRINIVIL,ZESTRIL) 5 MG tablet 90 tablet 1     Sig: Take 1 tablet by mouth Daily.   • clopidogrel (PLAVIX) 75 MG tablet 90 tablet 1     Sig: Take 1 tablet by mouth Daily.      Last office visit with prescribing clinician: 2/25/2022   Last telemedicine visit with prescribing clinician: 2/21/2023   Next office visit with prescribing clinician: 2/28/2023        Comprehensive Metabolic Panel (02/25/2022 10:41)                   Would you like a call back once the refill request has been completed: [] Yes [] No    If the office needs to give you a call back, can they leave a voicemail: [] Yes [] No    Barbara Klein LPN  12/30/22, 08:50 EST

## 2023-02-28 ENCOUNTER — OFFICE VISIT (OUTPATIENT)
Dept: INTERNAL MEDICINE | Facility: CLINIC | Age: 68
End: 2023-02-28
Payer: MEDICARE

## 2023-02-28 VITALS
DIASTOLIC BLOOD PRESSURE: 70 MMHG | HEIGHT: 69 IN | BODY MASS INDEX: 28.88 KG/M2 | TEMPERATURE: 96.9 F | HEART RATE: 67 BPM | WEIGHT: 195 LBS | OXYGEN SATURATION: 96 % | SYSTOLIC BLOOD PRESSURE: 114 MMHG

## 2023-02-28 DIAGNOSIS — H11.002 PTERYGIUM OF LEFT EYE: ICD-10-CM

## 2023-02-28 DIAGNOSIS — Z98.1 HISTORY OF FUSION OF CERVICAL SPINE: ICD-10-CM

## 2023-02-28 DIAGNOSIS — Z12.12 ENCOUNTER FOR SCREENING FOR COLORECTAL MALIGNANT NEOPLASM: ICD-10-CM

## 2023-02-28 DIAGNOSIS — Z83.71 FAMILY HISTORY OF COLONIC POLYPS: ICD-10-CM

## 2023-02-28 DIAGNOSIS — K40.90 RIGHT INGUINAL HERNIA: ICD-10-CM

## 2023-02-28 DIAGNOSIS — I10 PRIMARY HYPERTENSION: ICD-10-CM

## 2023-02-28 DIAGNOSIS — Z85.47 HISTORY OF TESTICULAR CANCER: ICD-10-CM

## 2023-02-28 DIAGNOSIS — Z12.11 ENCOUNTER FOR SCREENING FOR COLORECTAL MALIGNANT NEOPLASM: ICD-10-CM

## 2023-02-28 DIAGNOSIS — Z95.1 HX OF CABG: ICD-10-CM

## 2023-02-28 DIAGNOSIS — N48.6 PEYRONIE'S DISEASE: ICD-10-CM

## 2023-02-28 DIAGNOSIS — I25.810 CORONARY ARTERY DISEASE INVOLVING CORONARY BYPASS GRAFT OF NATIVE HEART WITHOUT ANGINA PECTORIS: ICD-10-CM

## 2023-02-28 DIAGNOSIS — R73.03 PREDIABETES: ICD-10-CM

## 2023-02-28 DIAGNOSIS — Z86.19 HISTORY OF HEPATITIS: ICD-10-CM

## 2023-02-28 DIAGNOSIS — R10.13 DYSPEPSIA: ICD-10-CM

## 2023-02-28 DIAGNOSIS — E78.2 MIXED HYPERLIPIDEMIA: ICD-10-CM

## 2023-02-28 DIAGNOSIS — K63.5 POLYP OF COLON, UNSPECIFIED PART OF COLON, UNSPECIFIED TYPE: ICD-10-CM

## 2023-02-28 DIAGNOSIS — Z00.00 ENCOUNTER FOR SUBSEQUENT ANNUAL WELLNESS VISIT (AWV) IN MEDICARE PATIENT: Primary | ICD-10-CM

## 2023-02-28 PROCEDURE — 1170F FXNL STATUS ASSESSED: CPT | Performed by: FAMILY MEDICINE

## 2023-02-28 PROCEDURE — 1159F MED LIST DOCD IN RCRD: CPT | Performed by: FAMILY MEDICINE

## 2023-02-28 PROCEDURE — G0439 PPPS, SUBSEQ VISIT: HCPCS | Performed by: FAMILY MEDICINE

## 2023-02-28 NOTE — PROGRESS NOTES
Date of Encounter: 2023  Patient: Khai Slater,  1955    SUBSEQUENT MEDICARE WELLNESS VISIT  Subjective   History of Presenting Illness  Chief complaint: Medicare wellness visit     No acute complaints.    Hypertension well-controlled on current regimen.    Coronary artery disease status post PCI and CABG, he has developed good collaterals and has not had any angina.  He follows with Dr. Law.      Hyperlipidemia, LDL a little elevated from last year but still close enough to goal that I do not want to change his medication yet.  He is tolerating this well.    Dyspepsia controlled on pantoprazole with EGD showing Jerome grade a esophagitis .  Family history of colon polyps and personal history of polyps with 2-year interval since C-scope .    Prediabetes, slightly worse compared to last year, he is less active has not been able to lose any weight.  Plans to restart regular exercise.     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     Pterygium of the left eye not causing any visual difficulties.  Needs to follow-up with ophthalmology.     History of testicular cancer status post orchiectomy, follows with urology annually.     History of fusion of cervical spine with no pain or sequela.     Distant history of hepatitis when in the , biopsy was negative, may been related to hydrazine exposure.  LFTs have been normal recently.    Right inguinal hernia, no discomfort, not enlarging.  Just monitoring for now.     Lives with wife.  3 children, 10 grandchildren.  1 pack year former smoker, AAA screening by proxy CT abdomen and pelvis in  was unremarkable.  Minimal alcohol use. Not currently exercising but plans to restart.   No recent dietary changes, tries to choose healthy foods. Colon cancer screening  with 2-year interval.  Foneshow  flew F-15's, F-16's, then worked for [employer], now a .  Has a living will not on file.  Discussed COVID bivalent,  Shingrix vaccination which he declines today.     Pain  In the past 7 days, how much pain have you felt? None    Review of Systems:  Negative for fever, congestion, chest pain upon exertion, shortness of breath, vision changes, vomiting, dysuria, lymphadenopathy, muscle weakness, numbness, mood changes, rashes.    Health Risk Assessment:    Recent Hospitalizations:  No hospitalization(s) within the last year.    Current Medical Providers:  Patient Care Team:  Janak Wills MD as PCP - General (Family Medicine)    Smoking Status:  Social History     Tobacco Use   Smoking Status Never   Smokeless Tobacco Never   Tobacco Comments    CAFFEINE USE       Alcohol Consumption:  Social History     Substance and Sexual Activity   Alcohol Use Yes   • Alcohol/week: 1.0 standard drink   • Types: 1 Cans of beer per week    Comment: occassionally       Depression Screen:   PHQ-2/PHQ-9 Depression Screening 2/28/2023   Retired PHQ-9 Total Score -   Retired Total Score -   Little Interest or Pleasure in Doing Things 0-->not at all   Feeling Down, Depressed or Hopeless 0-->not at all   PHQ-9: Brief Depression Severity Measure Score 0     I spent more than 16 minutes asking patient questions, counseling and documenting in the chart    Fall Risk Screen:  ARSENIO Fall Risk Assessment was completed, and patient is at LOW risk for falls.Assessment completed on:2/28/2023    Health Habits and Functional and Cognitive Screening:  Functional & Cognitive Status 2/28/2023   Do you have difficulty preparing food and eating? No   Do you have difficulty bathing yourself, getting dressed or grooming yourself? No   Do you have difficulty using the toilet? No   Do you have difficulty moving around from place to place? No   Do you have trouble with steps or getting out of a bed or a chair? No   Do you need help using the phone?  No   Are you deaf or do you have serious difficulty hearing?  No   Do you need help with transportation? No   Do you need help  shopping? No   Do you need help preparing meals?  No   Do you need help with housework?  No   Do you need help with laundry? No   Do you need help taking your medications? No   Do you need help managing money? No   Do you ever drive or ride in a car without wearing a seat belt? No   Do you have difficulty concentrating, remembering or making decisions? No       Does the patient have evidence of cognitive impairment? No    Aspirin use counseling:Does not need ASA (and currently is not on it)    Recent Lab Results:  Lab Results   Component Value Date    CHLPL 153 02/23/2023    TRIG 98 02/23/2023    HDL 51 02/23/2023    LDL 84 02/23/2023    VLDL 18 02/23/2023    HGBA1C 5.8 (H) 02/23/2023        Age-appropriate Screening Schedule:  Refer to the list below for future screening recommendations based on patient's age, sex and/or medical conditions. Orders for these recommended tests are listed in the plan section. The patient has been provided with a written plan.    Health Maintenance   Topic Date Due   • ZOSTER VACCINE (1 of 2) Never done   • INFLUENZA VACCINE  08/01/2022   • ANNUAL WELLNESS VISIT  02/25/2023   • Pneumococcal Vaccine 65+ (2 - PCV) 02/25/2023   • COLORECTAL CANCER SCREENING  03/24/2023   • LIPID PANEL  02/23/2024   • TDAP/TD VACCINES (2 - Td or Tdap) 10/01/2027   • HEPATITIS C SCREENING  Completed   • COVID-19 Vaccine  Discontinued       Compared to one year ago, the patient feels his physical health is the same.  Compared to one year ago, the patient feels his mental health is the same.    Reviewed chart for potential of high risk medication in the elderly: yes  Reviewed chart for potential of harmful drug interactions in the elderly:yes    Advanced Care Planning:  Advance Care Planning   ACP discussion was held with the patient during this visit. Patient has an advance directive (not in EMR), copy requested.    The following portions of the patient's history were reviewed and updated as appropriate:  allergies, current medications, past family history, past medical history, past social history, past surgical history and problem list.    Patient Active Problem List   Diagnosis   • Hyperlipemia   • History of hepatitis   • Coronary artery disease involving coronary bypass graft of native heart without angina pectoris   • Hypertension   • Prediabetes   • Dyspepsia   • Colon polyp   • Family history of colonic polyps   • Pterygium of left eye   • Hx of CABG   • Peyronie's disease   • History of testicular cancer   • History of fusion of cervical spine   • Right inguinal hernia     Past Medical History:   Diagnosis Date   • Acute non-ST-elevation myocardial infarction (HCC) 3/4/2016    Description: 7/07 tx with Taxus stent to dis circ   • CAD (coronary artery disease)    • Cancer (HCC)     testicular   • Coronary arteriosclerosis in native artery 3/4/2016    Description: due to FAA cath in 1/08 restenosis in circ and new lesion in LAD tx with cypher stents; 9/12 no sx but due to FAA relooked and CABG LIMA to LAD and SVG to circ by Dr Goode; 5/13 recath no sx and LIMA atretic and SVG to circ gone   • GERD (gastroesophageal reflux disease)    • Hyperlipidemia    • Hypertension    • NSTEMI (non-ST elevated myocardial infarction) (HCC)      Past Surgical History:   Procedure Laterality Date   • CARDIAC CATHETERIZATION     • COLONOSCOPY     • COLONOSCOPY N/A 3/24/2021    Procedure: COLONOSCOPY INTO CECUM AND T.I WITH SALINE LIFT COLD SNARE POLYPECTOMY  WITH CLIP PLACEMENT X1 IN CECUM AND COLD BIOPSY POLYPECTOMIES;  Surgeon: Shaq Quiñones MD;  Location: [facility] ENDOSCOPY;  Service: Gastroenterology;  Laterality: N/A;  PRE- HISTORY OF COLON POLYPS, PERSONAL HISTORY OF COLON POLYPS  POST- POLYPS, INTERNAL HEMORRHOIDS   • CORONARY ANGIOPLASTY WITH STENT PLACEMENT      5/2/2013; 70% stenosis in the LIMA to the LAD, % occluded, proximal LAD has 90% in stent stenosis, 100% distal left circumflex, 20% diffuse disease of the  "RCA.   • CORONARY ARTERY BYPASS GRAFT     • ENDOSCOPY N/A 3/24/2021    Procedure: ESOPHAGOGASTRODUODENOSCOPY WITH COLD BIOPSIES;  Surgeon: Shaq Quiñones MD;  Location[facility] ENDOSCOPY;  Service: Gastroenterology;  Laterality: N/A;  PRE- DYSPEPSIA  POST- ESOPHAGITIS, GASTRITIS   • ORCHIECTOMY Left      Family History   Problem Relation Age of Onset   • Heart disease Mother    • Heart attack Mother    • Hypertension Father    • No Known Problems Maternal Grandmother    • Diabetes Maternal Grandfather    • No Known Problems Paternal Grandmother    • No Known Problems Paternal Grandfather        Current Outpatient Medications:   •  atorvastatin (LIPITOR) 40 MG tablet, TAKE 1 TABLET DAILY, Disp: 90 tablet, Rfl: 3  •  carvedilol (COREG) 6.25 MG tablet, TAKE 1 TABLET TWICE A DAY, Disp: 180 tablet, Rfl: 3  •  clopidogrel (PLAVIX) 75 MG tablet, Take 1 tablet by mouth Daily., Disp: 90 tablet, Rfl: 3  •  Coenzyme Q10 (CO Q 10 PO), Take 1 tablet by mouth Daily., Disp: , Rfl:   •  lisinopril (PRINIVIL,ZESTRIL) 5 MG tablet, Take 1 tablet by mouth Daily., Disp: 90 tablet, Rfl: 3  •  nitroglycerin (NITROSTAT) 0.4 MG SL tablet, 1 under the tongue as needed for angina, may repeat q5mins for up three doses call 911 if not resolved, Disp: 25 tablet, Rfl: 3  •  pantoprazole (PROTONIX) 40 MG EC tablet, Take 1 tablet by mouth Daily., Disp: 90 tablet, Rfl: 3  No Known Allergies  Social History     Tobacco Use   • Smoking status: Never   • Smokeless tobacco: Never   • Tobacco comments:     CAFFEINE USE   Substance Use Topics   • Alcohol use: Yes     Alcohol/week: 1.0 standard drink     Types: 1 Cans of beer per week     Comment: occassionally   • Drug use: No          Objective   Physical Exam  Vitals:    02/28/23 0827   BP: 114/70   BP Location: Left arm   Patient Position: Sitting   Cuff Size: Adult   Pulse: 67   Temp: 96.9 °F (36.1 °C)   TempSrc: Infrared   SpO2: 96%   Weight: 88.5 kg (195 lb)   Height: 175.3 cm (69\")     Body mass " index is 28.8 kg/m².  Discussed the patient's BMI with him. The BMI Elevated, encouraged regular exercise especially with prediabetes.    Constitutional: NAD.  Eyes: EOMI. PERRLA. Normal conjunctiva.  Ear, nose, mouth, throat: No tonsillar exudates or erythema.   Normal nasal mucosa. Normal external ear canals and TMs bilaterally.  Cardiovascular: RRR. No murmurs. No LE edema b/l. Radial pulses 2+ bilaterally.  Pulmonary: CTA b/l. Good effort.  Genitourinary: Proximal inguinal hernia without extension into the scrotum, mostly palpable with Valsalva.  Nontender.  Integumentary: No lacerations or wounds on face or upper extremities.  Lymphatic: No anterior cervical lymphadenopathy.  Endocrine: No thyromegaly or palpable thyroid nodules.  Psychiatric: Normal affect. Normal thought content.       Assessment & Plan   Assessment and Plan  Very pleasant 67-year-old male with coronary artery disease status post CABG, testicular cancer status post orchiectomy, dyspepsia with history of colon polyps, hypertension, hyperlipidemia, history of cervical fusion, among other problems, who presents with the following:    Immunizations Discussed/Encouraged (specific immunizations; Shingrix and COVID19 )  Inactivity/Sedentary    The above risks/problems have been discussed with the patient.  Pertinent information has been shared with the patient in the After Visit Summary.  Other plans as below:    Diagnoses and all orders for this visit:    1. Encounter for subsequent annual wellness visit (AWV) in Medicare patient (Primary): We discussed preventative care including age and patient-appropriate immunizations and cancer screening. We also discussed the importance of exercise and a healthy diet. This is their annual preventative exam.    2. Primary hypertension: Controlled    3. Coronary artery disease involving coronary bypass graft of native heart without angina pectoris: No angina.  LDL near goal.  On clopidogrel antiplatelet  monotherapy per cardiology.  Continue to follow with cardiology.  4. Hx of CABG  5. Mixed hyperlipidemia    6. Dyspepsia: Controlled    7. Prediabetes: Stable    8. Right inguinal hernia: If begins to become symptomatic or enlarging would refer to general surgery for management    9. Pterygium of left eye: Needs to follow with ophthalmology    10. History of testicular cancer: Continue to follow with urology    11. Peyronie's disease: Did not improve with injections    12. History of hepatitis: No evidence of recurrence    13. History of fusion of cervical spine: Stable with minimal symptoms    14. Polyp of colon, unspecified part of colon, unspecified type  -     Ambulatory Referral For Screening Colonoscopy    15. Family history of colonic polyps  -     Ambulatory Referral For Screening Colonoscopy    16. Encounter for screening for colorectal malignant neoplasm  -     Ambulatory Referral For Screening Colonoscopy    Follow Up:  Return to office in 1 year for annual physical or earlier as needed.    An After Visit Summary and PPPS were given to the patient.      Janak Wills MD  Family Medicine  O: [phone]    Disclaimer: Parts of this note were dictated by speech recognition. Minor errors in transcription may be present. Please call if questions.

## 2023-03-22 ENCOUNTER — PRE-PROCEDURE SCREENING (OUTPATIENT)
Dept: GASTROENTEROLOGY | Facility: CLINIC | Age: 68
End: 2023-03-22
Payer: MEDICARE

## 2023-03-22 ENCOUNTER — PREP FOR SURGERY (OUTPATIENT)
Dept: OTHER | Facility: HOSPITAL | Age: 68
End: 2023-03-22
Payer: MEDICARE

## 2023-03-22 DIAGNOSIS — K63.5 POLYP OF COLON, UNSPECIFIED PART OF COLON, UNSPECIFIED TYPE: Primary | ICD-10-CM

## 2023-03-22 DIAGNOSIS — Z83.71 FAMILY HISTORY OF COLONIC POLYPS: ICD-10-CM

## 2023-03-22 NOTE — TELEPHONE ENCOUNTER
LAST SCOPE 3/21 IN Epic --Personal history of polyps--Family history of polyps ( mom)--No family history of colon cancer--PLAVIX ASPIRIN--Medications:           atorvastatin (LIPITOR) 40 MG tablet    carvedilol (COREG) 6.25 MG tablet  clopidogrel (PLAVIX) 75 MG tablet  Coenzyme Q10 (CO Q 10 PO)    lisinopril (PRINIVIL,ZESTRIL) 5 MG tablet    nitroglycerin (NITROSTAT) 0.4 MG SL tablet  pantoprazole (PROTONIX) 40 MG EC tablet                 QUESTIONNAIRE SCREENING  SCAN IN  MEDIA & HAS BEEN SENT TO DOCTOR FOR REVIEW

## 2023-03-29 RX ORDER — CARVEDILOL 6.25 MG/1
6.25 TABLET ORAL 2 TIMES DAILY
Qty: 180 TABLET | Refills: 3 | Status: SHIPPED | OUTPATIENT
Start: 2023-03-29

## 2023-03-29 NOTE — TELEPHONE ENCOUNTER
Caller: Bryon Khai MORALEZ    Relationship: Self    Best call back number: [phone]    Requested Prescriptions:   Requested Prescriptions     Pending Prescriptions Disp Refills   • carvedilol (COREG) 6.25 MG tablet 180 tablet 3     Sig: Take 1 tablet by mouth 2 (Two) Times a Day.        Pharmacy where request should be sent: [facility] [id number] [address] AT [facility] [phone] [facility] [phone]      Last office visit with prescribing clinician: 2/28/2023   Last telemedicine visit with prescribing clinician: Visit date not found   Next office visit with prescribing clinician: 3/4/2024     Additional details provided by patient: GOING OUT OF TOWN AND [facility] WILL NOT HAVE IT TO HIM IN TIME. WILL BE GONE FOR 3 WEEKS, WOULD LIKE TO KNOW IF 3 WEEK SUPPLY CAN BE SENT TO LOCAL PHARMACY         Risa Warren Rep   03/29/23 10:09 EDT

## 2023-04-17 ENCOUNTER — PREP FOR SURGERY (OUTPATIENT)
Dept: OTHER | Facility: HOSPITAL | Age: 68
End: 2023-04-17
Payer: MEDICARE

## 2023-04-24 ENCOUNTER — TELEPHONE (OUTPATIENT)
Dept: GASTROENTEROLOGY | Facility: CLINIC | Age: 68
End: 2023-04-24
Payer: MEDICARE

## 2023-04-24 NOTE — TELEPHONE ENCOUNTER
TALON patient via telephone for. Scheduled COLONOSCOPY 9- with arrival time of 9:30AM. Prep paperwork mailed to verified address on file. Patient advised arrival time may change based on [facility] guidelines. TALON OAKLEY

## 2023-05-09 RX ORDER — ATORVASTATIN CALCIUM 40 MG/1
40 TABLET, FILM COATED ORAL DAILY
Qty: 90 TABLET | Refills: 3 | Status: SHIPPED | OUTPATIENT
Start: 2023-05-09

## 2023-05-09 NOTE — TELEPHONE ENCOUNTER
Caller: Bryon Khai KIRT    Relationship: Self    Best call back number: [phone]    Requested Prescriptions:   Requested Prescriptions     Pending Prescriptions Disp Refills   • atorvastatin (LIPITOR) 40 MG tablet 90 tablet 3     Sig: Take 1 tablet by mouth Daily.        Pharmacy where request should be sent: [facility] [id number] [facility] [address] [facility] [address] [facility] [phone] [facility] [phone]      Last office visit with prescribing clinician: 2/28/2023   Last telemedicine visit with prescribing clinician: 3/29/2023   Next office visit with prescribing clinician: 5/22/2023     Additional details provided by patient: PATIENT IS OUT OF MEDICATION AND IS LEAVING TOWN ON FRIDAY    Does the patient have less than a 3 day supply:  [x] Yes  [] No    Would you like a call back once the refill request has been completed: [] Yes [x] No    If the office needs to give you a call back, can they leave a voicemail: [] Yes [x] No    Risa Miguel Rep   05/09/23 08:24 EDT

## 2023-05-24 ENCOUNTER — OFFICE VISIT (OUTPATIENT)
Dept: CARDIOLOGY | Facility: CLINIC | Age: 68
End: 2023-05-24
Payer: MEDICARE

## 2023-05-24 VITALS
DIASTOLIC BLOOD PRESSURE: 68 MMHG | HEART RATE: 61 BPM | WEIGHT: 190.8 LBS | HEIGHT: 69 IN | SYSTOLIC BLOOD PRESSURE: 108 MMHG | BODY MASS INDEX: 28.26 KG/M2

## 2023-05-24 DIAGNOSIS — I10 PRIMARY HYPERTENSION: ICD-10-CM

## 2023-05-24 DIAGNOSIS — Z95.1 HX OF CABG: Primary | ICD-10-CM

## 2023-05-24 DIAGNOSIS — I25.810 CORONARY ARTERY DISEASE INVOLVING CORONARY BYPASS GRAFT OF NATIVE HEART WITHOUT ANGINA PECTORIS: ICD-10-CM

## 2023-05-24 DIAGNOSIS — E78.2 MIXED HYPERLIPIDEMIA: ICD-10-CM

## 2023-05-24 NOTE — PROGRESS NOTES
Date of Office Visit: 23  Encounter Provider: Torsten Law MD  Place of Service: [facility] CARDIOLOGY  Patient Name: Khai Slater  :1955  [id number]    Chief Complaint   Patient presents with   • Coronary Artery Disease   :     HPI: Khai Slater is a 68 y.o. male  He had a non-STEMI in .  To his distal circ he had a Taxus stent put in.   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  He had a repeat cath and his circ had restenosed, and they felt he had a new lesion in his LAD.  He had Cypher stents at that time.  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  They felt that those had all re-stenosed and so he underwent a bypass.  He had a LIMA to the LAD, and a vein to the circ.  The RCA has always been okay.   In , recathed, no symptoms.   The LIMA they felt was somewhat smallish and may have a lesion at the insertion site, the vein to the circumflex was occluded, the RCA had some 20% disease in it.  We have managed him medically since then.  In  he had a stress test that was normal.  Honestly, when I have looked at his films I feel like that DAMEON to the LAD is probably okay and does not have significant disease in it.  I think that he has a small area of his distal circ that is a problem, but that does not even show up on his stress.  His right coronaries are okay, and his LAD is okay.    He is here for follow-up today in general has been doing well no chest pain shortness of breath PND orthopnea edema.  He walks a lot.  He talk to me a lot about G forces on the heart was wondering questions about it he also was explaining how there is an increased incidence of cancers among  they feel like it may be due to radar or electronics exposure not sure.  He has been feeling pretty well though he is doing financial planning for 's now    Past Medical History:   Diagnosis Date   • Acute  non-ST-elevation myocardial infarction 3/4/2016    Description: 7/07 tx with Taxus stent to dis circ   • CAD (coronary artery disease)    • Cancer     testicular   • Coronary arteriosclerosis in native artery 3/4/2016    Description: due to FAA cath in 1/08 restenosis in circ and new lesion in LAD tx with cypher stents; 9/12 no sx but due to FAA relooked and CABG LIMA to LAD and SVG to circ by Dr Goode; 5/13 recath no sx and LIMA atretic and SVG to circ gone   • GERD (gastroesophageal reflux disease)    • Hyperlipidemia    • Hypertension    • NSTEMI (non-ST elevated myocardial infarction)        Past Surgical History:   Procedure Laterality Date   • CARDIAC CATHETERIZATION     • COLONOSCOPY     • COLONOSCOPY N/A 3/24/2021    Procedure: COLONOSCOPY INTO CECUM AND T.I WITH SALINE LIFT COLD SNARE POLYPECTOMY  WITH CLIP PLACEMENT X1 IN CECUM AND COLD BIOPSY POLYPECTOMIES;  Surgeon: Shaq Quiñones MD;  Location: [facility] ENDOSCOPY;  Service: Gastroenterology;  Laterality: N/A;  PRE- HISTORY OF COLON POLYPS, PERSONAL HISTORY OF COLON POLYPS  POST- POLYPS, INTERNAL HEMORRHOIDS   • CORONARY ANGIOPLASTY WITH STENT PLACEMENT      5/2/2013; 70% stenosis in the LIMA to the LAD, % occluded, proximal LAD has 90% in stent stenosis, 100% distal left circumflex, 20% diffuse disease of the RCA.   • CORONARY ARTERY BYPASS GRAFT     • ENDOSCOPY N/A 3/24/2021    Procedure: ESOPHAGOGASTRODUODENOSCOPY WITH COLD BIOPSIES;  Surgeon: Shaq Quiñones MD;  Location: [facility] ENDOSCOPY;  Service: Gastroenterology;  Laterality: N/A;  PRE- DYSPEPSIA  POST- ESOPHAGITIS, GASTRITIS   • ORCHIECTOMY Left        Social History     Socioeconomic History   • Marital status:    Tobacco Use   • Smoking status: Never   • Smokeless tobacco: Never   • Tobacco comments:     CAFFEINE USE   Substance and Sexual Activity   • Alcohol use: Yes     Alcohol/week: 1.0 standard drink     Types: 1 Cans of beer per week     Comment: occassionally   • Drug use: No    • Sexual activity: Defer       Family History   Problem Relation Age of Onset   • Heart disease Mother    • Heart attack Mother    • Hypertension Father    • No Known Problems Maternal Grandmother    • Diabetes Maternal Grandfather    • No Known Problems Paternal Grandmother    • No Known Problems Paternal Grandfather        Review of Systems   Constitutional: Negative for decreased appetite, fever, malaise/fatigue and weight loss.   HENT: Negative for nosebleeds.    Eyes: Negative for double vision.   Cardiovascular: Negative for chest pain, claudication, cyanosis, dyspnea on exertion, irregular heartbeat, leg swelling, near-syncope, orthopnea, palpitations, paroxysmal nocturnal dyspnea and syncope.   Respiratory: Negative for cough, hemoptysis and shortness of breath.    Hematologic/Lymphatic: Negative for bleeding problem.   Skin: Negative for rash.   Musculoskeletal: Negative for falls and myalgias.   Gastrointestinal: Negative for hematochezia, jaundice, melena, nausea and vomiting.   Genitourinary: Negative for hematuria.   Neurological: Negative for dizziness and seizures.   Psychiatric/Behavioral: Negative for altered mental status and memory loss.       No Known Allergies      Current Outpatient Medications:   •  atorvastatin (LIPITOR) 40 MG tablet, Take 1 tablet by mouth Daily., Disp: 90 tablet, Rfl: 3  •  carvedilol (COREG) 6.25 MG tablet, Take 1 tablet by mouth 2 (Two) Times a Day., Disp: 180 tablet, Rfl: 3  •  clopidogrel (PLAVIX) 75 MG tablet, Take 1 tablet by mouth Daily., Disp: 90 tablet, Rfl: 3  •  Coenzyme Q10 (CO Q 10 PO), Take 1 tablet by mouth Daily., Disp: , Rfl:   •  lisinopril (PRINIVIL,ZESTRIL) 5 MG tablet, Take 1 tablet by mouth Daily., Disp: 90 tablet, Rfl: 3  •  nitroglycerin (NITROSTAT) 0.4 MG SL tablet, 1 under the tongue as needed for angina, may repeat q5mins for up three doses call 911 if not resolved, Disp: 25 tablet, Rfl: 3  •  pantoprazole (PROTONIX) 40 MG EC tablet, Take 1  "tablet by mouth Daily., Disp: 90 tablet, Rfl: 3      Objective:     Vitals:    05/24/23 1031   BP: 108/68   Pulse: 61   Weight: 86.5 kg (190 lb 12.8 oz)   Height: 175.3 cm (69\")     Body mass index is 28.18 kg/m².    Constitutional:       Appearance: Well-developed.   Eyes:      General: No scleral icterus.  HENT:      Head: Normocephalic.   Neck:      Thyroid: No thyromegaly.      Vascular: No JVD.      Lymphadenopathy: No cervical adenopathy.   Pulmonary:      Effort: Pulmonary effort is normal.      Breath sounds: Normal breath sounds. No wheezing. No rales.   Cardiovascular:      Normal rate. Regular rhythm.      No gallop.   Edema:     Peripheral edema absent.   Abdominal:      Palpations: Abdomen is soft.      Tenderness: There is no abdominal tenderness.   Musculoskeletal: Normal range of motion. Skin:     General: Skin is warm and dry.      Findings: No rash.   Neurological:      Mental Status: Alert and oriented to person, place, and time.           ECG 12 Lead    Date/Time: 5/24/2023 11:25 AM  Performed by: Torsten Law MD  Authorized by: Torsten Law MD   Comparison: compared with previous ECG   Rhythm: sinus rhythm    Clinical impression: normal ECG             Assessment:       Diagnosis Plan   1. Hx of CABG        2. Coronary artery disease involving coronary bypass graft of native heart without angina pectoris        3. Mixed hyperlipidemia        4. Primary hypertension               Plan:       Well in general I think that he is doing well.  He is asymptomatic he is on good medical therapy I am not can to make any changes I am not really aware of the physiologic effects of gravitational forces on the heart.  He was a  and pulled high G forces all the time.  I will see if I can find anything out about it I will have him come see me again in a year    Return in about 1 year (around 5/24/2024).     As always, it has been a pleasure to participate in your patient's " care.      Sincerely,       Torsten Law MD

## 2023-06-02 ENCOUNTER — OFFICE VISIT (OUTPATIENT)
Dept: INTERNAL MEDICINE | Facility: CLINIC | Age: 68
End: 2023-06-02

## 2023-06-02 VITALS
OXYGEN SATURATION: 97 % | TEMPERATURE: 97.1 F | DIASTOLIC BLOOD PRESSURE: 88 MMHG | SYSTOLIC BLOOD PRESSURE: 140 MMHG | HEIGHT: 69 IN | WEIGHT: 189.8 LBS | BODY MASS INDEX: 28.11 KG/M2 | HEART RATE: 63 BPM

## 2023-06-02 DIAGNOSIS — Z85.47 HISTORY OF TESTICULAR CANCER: ICD-10-CM

## 2023-06-02 DIAGNOSIS — M54.2 CHRONIC NECK PAIN: ICD-10-CM

## 2023-06-02 DIAGNOSIS — M25.561 CHRONIC PAIN OF RIGHT KNEE: ICD-10-CM

## 2023-06-02 DIAGNOSIS — Z02.71 DISABILITY EXAMINATION: ICD-10-CM

## 2023-06-02 DIAGNOSIS — G89.29 CHRONIC MIDLINE LOW BACK PAIN WITHOUT SCIATICA: Primary | ICD-10-CM

## 2023-06-02 DIAGNOSIS — G89.29 CHRONIC NECK PAIN: ICD-10-CM

## 2023-06-02 DIAGNOSIS — G89.29 CHRONIC PAIN OF RIGHT KNEE: ICD-10-CM

## 2023-06-02 DIAGNOSIS — M54.50 CHRONIC MIDLINE LOW BACK PAIN WITHOUT SCIATICA: Primary | ICD-10-CM

## 2023-06-02 PROBLEM — K76.0 HEPATIC STEATOSIS: Status: ACTIVE | Noted: 2023-06-02

## 2023-06-02 NOTE — PROGRESS NOTES
Date of Encounter: 2023  Patient: Khai Slater,  1955    Subjective   History of Presenting Illness  Chief complaint: Chronic back pain    Patient presents to discuss chronic problems that he is wondering if are [facility]    He has a history of chronic lower back pain with midline numbness that worsens with walking.  He has not had imaging of this with MRI.  He has a history of doing physical therapy and taking anti-inflammatories without significant improvement in his back pain.  He also has chronic neck pain and he had to do frequent hi-G training where he turned his neck under up to 9 Gs.  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    Patient's right knee gives out intermittently, it sounds like he had an effusion drained at 1 point in the past for this problem.  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    History of liver inflammation with negative biopsy, hepatic steatosis.  He has had exposure to non-hydrazine jet fuels.  He has a history of testicular cancer.    He brings records related to the above conditions including imaging and previous specialist consultation notes.  He is requesting that I review these and write a letter if I feel that it is reasonable some of these may be service-connected injuries.  He hopes to use this letter to get these problems service-connected.  He also provides several recent studies including cancer rates along jet fighter pilots as well as literature describing ongoing studies on this issues.    The following portions of the patient's history were reviewed and updated as appropriate: allergies, current medications, past family history, past medical history, past social history, past surgical history and problem list.    Patient Active Problem List   Diagnosis    Hyperlipemia    History of hepatitis    Coronary artery disease involving coronary bypass graft of native heart without angina pectoris    Hypertension    Prediabetes     Dyspepsia    Colon polyp    Family history of colonic polyps    Pterygium of left eye    Hx of CABG    Peyronie's disease    History of testicular cancer    History of fusion of cervical spine    Right inguinal hernia    Hepatic steatosis    Chronic neck pain    Chronic pain of right knee     Past Medical History:   Diagnosis Date    Acute non-ST-elevation myocardial infarction 3/4/2016    Description: 7/07 tx with Taxus stent to dis circ    CAD (coronary artery disease)     Cancer     testicular    Coronary arteriosclerosis in native artery 3/4/2016    Description: due to FAA cath in 1/08 restenosis in circ and new lesion in LAD tx with cypher stents; 9/12 no sx but due to FAA relooked and CABG LIMA to LAD and SVG to circ by Dr Goode; 5/13 recath no sx and LIMA atretic and SVG to circ gone    GERD (gastroesophageal reflux disease)     Hyperlipidemia     Hypertension     NSTEMI (non-ST elevated myocardial infarction)      Past Surgical History:   Procedure Laterality Date    CARDIAC CATHETERIZATION      COLONOSCOPY      COLONOSCOPY N/A 3/24/2021    Procedure: COLONOSCOPY INTO CECUM AND T.I WITH SALINE LIFT COLD SNARE POLYPECTOMY  WITH CLIP PLACEMENT X1 IN CECUM AND COLD BIOPSY POLYPECTOMIES;  Surgeon: Shaq Quiñones MD;  Location: [facility] ENDOSCOPY;  Service: Gastroenterology;  Laterality: N/A;  PRE- HISTORY OF COLON POLYPS, PERSONAL HISTORY OF COLON POLYPS  POST- POLYPS, INTERNAL HEMORRHOIDS    CORONARY ANGIOPLASTY WITH STENT PLACEMENT      5/2/2013; 70% stenosis in the LIMA to the LAD, % occluded, proximal LAD has 90% in stent stenosis, 100% distal left circumflex, 20% diffuse disease of the RCA.    CORONARY ARTERY BYPASS GRAFT      ENDOSCOPY N/A 3/24/2021    Procedure: ESOPHAGOGASTRODUODENOSCOPY WITH COLD BIOPSIES;  Surgeon: Shaq Quiñones MD;  Location: [facility] ENDOSCOPY;  Service: Gastroenterology;  Laterality: N/A;  PRE- DYSPEPSIA  POST- ESOPHAGITIS, GASTRITIS    ORCHIECTOMY Left      Family History  "  Problem Relation Age of Onset    Heart disease Mother     Heart attack Mother     Hypertension Father     No Known Problems Maternal Grandmother     Diabetes Maternal Grandfather     No Known Problems Paternal Grandmother     No Known Problems Paternal Grandfather        Current Outpatient Medications:     atorvastatin (LIPITOR) 40 MG tablet, Take 1 tablet by mouth Daily., Disp: 90 tablet, Rfl: 3    carvedilol (COREG) 6.25 MG tablet, Take 1 tablet by mouth 2 (Two) Times a Day., Disp: 180 tablet, Rfl: 3    clopidogrel (PLAVIX) 75 MG tablet, Take 1 tablet by mouth Daily., Disp: 90 tablet, Rfl: 3    Coenzyme Q10 (CO Q 10 PO), Take 1 tablet by mouth Daily., Disp: , Rfl:     lisinopril (PRINIVIL,ZESTRIL) 5 MG tablet, Take 1 tablet by mouth Daily., Disp: 90 tablet, Rfl: 3    nitroglycerin (NITROSTAT) 0.4 MG SL tablet, 1 under the tongue as needed for angina, may repeat q5mins for up three doses call 911 if not resolved, Disp: 25 tablet, Rfl: 3    pantoprazole (PROTONIX) 40 MG EC tablet, Take 1 tablet by mouth Daily., Disp: 90 tablet, Rfl: 3  No Known Allergies  Social History     Tobacco Use    Smoking status: Never    Smokeless tobacco: Never    Tobacco comments:     CAFFEINE USE   Substance Use Topics    Alcohol use: Yes     Alcohol/week: 1.0 standard drink     Types: 1 Cans of beer per week     Comment: occassionally    Drug use: No          Objective   Physical Exam  Vitals:    06/02/23 1305   BP: 140/88   BP Location: Left arm   Patient Position: Sitting   Cuff Size: Adult   Pulse: 63   Temp: 97.1 °F (36.2 °C)   TempSrc: Infrared   SpO2: 97%   Weight: 86.1 kg (189 lb 12.8 oz)   Height: 175.3 cm (69\")     Body mass index is 28.03 kg/m².       Assessment & Plan   Assessment and Plan  Very pleasant 68-year-old male with coronary artery disease status post CABG, testicular cancer status post orchiectomy, dyspepsia with history of colon polyps, hypertension, hyperlipidemia, history of cervical fusion, among other " problems, who presents with the following:     Diagnoses and all orders for this visit:    1. Chronic midline low back pain without sciatica (Primary)  -     MRI Lumbar Spine Without Contrast    2. Chronic neck pain    3. History of testicular cancer    4. Disability examination    5. Chronic pain of right knee    I spent 40 minutes face-to-face with the patient discussing these issues and his history.  I also spent an additional 30 minutes later that day reviewing his records.  I told the patient that I will review his records over the next 1 to 2 weeks and compose a letter describing the above medical issues along with a professional opinion on whether it is reasonable that these may be service-connected.  This letter will be placed into the patient's medical record once complete.    Janak Wills MD  Family Medicine  O: [phone]    Disclaimer: Parts of this note were dictated by speech recognition. Minor errors in transcription may be present. Please call if questions.

## 2023-06-04 PROBLEM — M54.50 CHRONIC MIDLINE LOW BACK PAIN WITHOUT SCIATICA: Status: ACTIVE | Noted: 2023-06-04

## 2023-06-04 PROBLEM — M54.2 CHRONIC NECK PAIN: Status: ACTIVE | Noted: 2023-06-04

## 2023-06-04 PROBLEM — Z98.890 HISTORY OF LIVER BIOPSY: Status: ACTIVE | Noted: 2023-06-04

## 2023-06-04 PROBLEM — G89.29 CHRONIC MIDLINE LOW BACK PAIN WITHOUT SCIATICA: Status: ACTIVE | Noted: 2023-06-04

## 2023-06-04 PROBLEM — M25.561 CHRONIC PAIN OF RIGHT KNEE: Status: ACTIVE | Noted: 2023-06-04

## 2023-06-04 PROBLEM — G89.29 CHRONIC PAIN OF RIGHT KNEE: Status: ACTIVE | Noted: 2023-06-04

## 2023-06-04 PROBLEM — G89.29 CHRONIC NECK PAIN: Status: ACTIVE | Noted: 2023-06-04

## 2023-06-18 ENCOUNTER — TELEPHONE (OUTPATIENT)
Dept: INTERNAL MEDICINE | Facility: CLINIC | Age: 68
End: 2023-06-18
Payer: MEDICARE

## 2023-06-18 NOTE — LETTER
June 18, 2023     Patient: Khai Slater   YOB: 1955   Date of Visit: 6/18/2023     To whom it may concern,    Mr. Khai Slater is a pleasant 68-year-old male who has been seeing me in a primary care capacity since 2/25/2022 at my office associated with [facility] in [place], [place].      He came to my office on 6/2/2023 for an appointment to discuss chronic medical conditions for which he would like to establish  service connection. He asks me to write a note in support of these connections. He brings medical records for my review from his Urologist Dr. Dru Castillo, Family Practice physician Dr. Alexa Huff, Hepatologist Dr. Deangelo Pineda, Orthopedist Dr. Jean Jurado, and hospital medical records from his [place] where he was admitted from 7/29/2007-07/30/2007. This is in addition to the records already available to me in his chart in the electronic medical record at [facility].    The first medical problem I reviewed was degenerative osteoarthritis of the right knee.  He initially presented to Dr. Jurado for his right knee on 11/28/2018 with right knee pain after a fall about a week prior.  He eventually underwent an MRI of the right knee which demonstrated an MCL sprain and early patellofemoral arthritis of the right knee without any meniscus injuries.  He did have a drainage of an effusion and a corticosteroid injection for symptomatic relief.  He continues to have intermittent symptoms of his knee giving out along with discomfort with prolonged ambulation.  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 It is understood that repetitive impact of a joint increases risk of developing degenerative joint disease.    My professional opinion is that while there is no direct service connection from his current knee problem to these activities and service, it is more  "reasonable than not that they contributed to the development of degenerative joint disease later in his life, specifically patellofemoral arthritis of the right knee.    The second medical problem I reviewed was his testicular cancer.  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  He underwent left inguinal orchiectomy.  There was no evidence of metastatic disease.  He underwent radiation in September of that year for his retroperitoneal nodes and ipsilateral pelvic nodes.  At this time this intervention has been curative and he lives evidence free of recurrent disease.  During his service he had exposure to nonhydrazine jet fuels.  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  This study noted that male fighter aviators had greater odds of testicular cancer diagnosis then their officer peers.  This study also references a case control study of [employer] officers hospitalized between 1988 in 1999 which found increased odds of testicular cancer among aviators.  Patient also references the phase 1A study \"study on the incidence of cancer diagnosis and mortality among  aviator's and aviation support personnel\" published on January 2023 for the department of defense.  One result from the study was that compared to a demographically similar [place] population, air crew had a 24% higher rate of cancer for all sites combined.  The SIR for testicular cancer specifically an air crew compared to the [place] population from 19  was 1.10 with a P value of 0.1896.    My professional opinion is that while there is no direct service  connection to his testicular cancer, " "it is more likely than not that he was at a higher risk of testicular cancer compared to the general population due to his service. There is a supplementary phase 1B study being conducted to augment the cancer incidence analysis from this study prior to initiating a phase 2 study.  I do not have these preliminary results at this time.    The third medical condition to discuss is his coronary artery disease.  On 7/29/2007 patient presented to [facility] in [place], [place] with chest pain and was found to have an NSTEMI.  Coronary catheterization demonstrated 95% stenosis of the left circumflex artery where he subsequently underwent PCI.  He was also noted to have borderline disease of the mid distal LAD.  He was of course also managed medically.  He underwent a repeat catheterization in 2008 and his circumflex had restenosed and he had a new lesion in his LAD and he underwent PCI once again.  In 2012 he underwent a third catheterization and was found to have restenosis once again and underwent CABG, specifically LIMA to LAD and a vein to the circumflex.  In 2013 he was recatheterized and his vein to circumflex was occluded, and he was managed medically.  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  Indeed, there have been several mechanisms proposed including repetitive vascular trauma from G forces as well as frequent, episodic elevation in blood pressure which is a known risk factor for coronary artery disease.  At this time I cannot find any literature demonstrating a higher prevalence of coronary artery disease among 's compared to the general population. His cardiologist Dr. Torsten Law noted on 5/24/2023 that \"I am not really aware of the physiologic effects of gravitational forces on the heart,\"  in reference to his concerns about high G forces and incidence of coronary artery disease.  He did say " "he would look into this further.    My professional opinion is that, at this time, there is a lack of evidence to prove service connection to his coronary artery disease.    The fourth medical condition for which she is seeking service connection is his history of elevated liver enzymes.  In January 1999 he was found by his primary care provider to have elevated liver function tests, specifically SGPT 132, .  He underwent further evaluation which did not demonstrate hemochromatosis, autoimmune hepatitis, viral hepatitis, or alpha-1 antitrypsin deficiency.  This chronic hepatitis persistent for nearly 4 months and he underwent a liver biopsy.  Liver biopsy performed on 5/10/1999 which represented \"and essentially normal liver biopsy\".  An ultrasound of the liver and biliary system was also normal.  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  This [facility] has recognized that exposure in excess of typical public levels can increase the risk of liver disease in laboratory animals but has not found risk of liver disease in humans at this time.    My professional opinion is that there is a lack of evidence to prove service connection to his elevated liver enzymes and jet fuel exposure but there is a biologic plausibility demonstrated in animal studies.    The last medical condition to review is his chronic lower back pain.  I do not have records related to this available in my system. He has a history of chronic lower back pain with midline numbness that worsens with walking.  He has not had imaging of this with MRI, and I do not have any dedicated radiographs of his lumbar spine for review.  He has a history of doing physical therapy and taking anti-inflammatories without significant improvement in his back pain.  Of note he does have a history of cervical fusion of C6 and C7 that has been reportedly service-connected.  At our visit on 6/2/2023 I " recommended an MRI of the lower back to further characterize underlying disease due to his chronic pain.  It is highly likely based upon his symptoms and lack of improvement with physical therapy that he may have lumbar degenerative disc disease.      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  I am not aware of specific studies done by the department of defense exploring rates of lumbar degenerative disc disease in pilots.  Just as I would say that a runner has a higher risk of degenerative joint disease of the knee due to repetitive loading, I would suspect that it is common sense to extend this line of reasoning to the lumbar spine of a . Since Mr. Slater has service-connected cervical degenerative disc disease, it would make sense to also extend this connection to other affected areas of the spine if arthritis is found to be present on MRI.    Thanks for considering my medical opinion with respect to Mr. Slater's chronic medical problems and his service history. If you have any questions or concerns please do not hesitate to call my office.      Sincerely,        Janak Wills MD

## 2023-06-29 PROBLEM — M51.36 LUMBAR DEGENERATIVE DISC DISEASE: Status: ACTIVE | Noted: 2023-06-29

## 2023-06-29 PROBLEM — M51.369 LUMBAR DEGENERATIVE DISC DISEASE: Status: ACTIVE | Noted: 2023-06-29

## 2023-08-06 DIAGNOSIS — K21.9 GASTROESOPHAGEAL REFLUX DISEASE, UNSPECIFIED WHETHER ESOPHAGITIS PRESENT: ICD-10-CM

## 2023-08-07 RX ORDER — PANTOPRAZOLE SODIUM 40 MG/1
TABLET, DELAYED RELEASE ORAL
Qty: 90 TABLET | Refills: 3 | Status: SHIPPED | OUTPATIENT
Start: 2023-08-07

## 2023-08-17 ENCOUNTER — TELEPHONE (OUTPATIENT)
Dept: INTERNAL MEDICINE | Facility: CLINIC | Age: 68
End: 2023-08-17
Payer: MEDICARE

## 2023-08-17 ENCOUNTER — TELEPHONE (OUTPATIENT)
Dept: GASTROENTEROLOGY | Facility: CLINIC | Age: 68
End: 2023-08-17

## 2023-08-17 NOTE — TELEPHONE ENCOUNTER
Hub staff attempted to follow warm transfer process and was unsuccessful     Caller: Khai Slater    Relationship to patient: Self    Best call back number: [phone]    Patient is needing: PATIENT WOULD LIKE TO CANCEL COLONOSCOPY SCHEDULED 09/22/2023

## 2023-08-17 NOTE — TELEPHONE ENCOUNTER
Caller: Khai Slater    Relationship: Self    Best call back number: [phone]     What orders are you requesting (i.e. lab or imaging): COLONOSCOPY    In what timeframe would the patient need to come in: ASAP    Where will you receive your lab/imaging services: [facility]    Additional notes: PATIENT WOULD LIKE TO GO TO [place] INSTEAD OF [facility] FOR HIS COLONOSCOPY, PLEASE ADVISE WHEN ORDERS ARE SENT.

## 2023-08-18 ENCOUNTER — TELEPHONE (OUTPATIENT)
Dept: GASTROENTEROLOGY | Facility: CLINIC | Age: 68
End: 2023-08-18

## 2023-08-18 NOTE — TELEPHONE ENCOUNTER
LVM for pt to advise that order is in, as he cancelled already scheduled colonoscopy. Advised referrals coordinator about rerouting referral, she will reroute and pt was advised if any questions to call.

## 2023-08-18 NOTE — TELEPHONE ENCOUNTER
Khai Slater     Relationship to patient: Self     Best call back number: [phone]     Patient is needing: PATIENT WOULD LIKE TO CANCEL COLONOSCOPY SCHEDULED 09/22/2023         Note

## 2023-12-06 DIAGNOSIS — K21.9 GASTROESOPHAGEAL REFLUX DISEASE, UNSPECIFIED WHETHER ESOPHAGITIS PRESENT: ICD-10-CM

## 2023-12-06 RX ORDER — ATORVASTATIN CALCIUM 40 MG/1
40 TABLET, FILM COATED ORAL DAILY
Qty: 90 TABLET | Refills: 3 | Status: SHIPPED | OUTPATIENT
Start: 2023-12-06

## 2023-12-06 RX ORDER — PANTOPRAZOLE SODIUM 40 MG/1
40 TABLET, DELAYED RELEASE ORAL DAILY
Qty: 90 TABLET | Refills: 3 | Status: SHIPPED | OUTPATIENT
Start: 2023-12-06

## 2023-12-06 NOTE — TELEPHONE ENCOUNTER
Caller: Khai Slater    Relationship: Self    Best call back number: [phone]    Requested Prescriptions:   Requested Prescriptions     Pending Prescriptions Disp Refills    atorvastatin (LIPITOR) 40 MG tablet 90 tablet 3     Sig: Take 1 tablet by mouth Daily.    pantoprazole (PROTONIX) 40 MG EC tablet 90 tablet 3     Sig: Take 1 tablet by mouth Daily.        Pharmacy where request should be sent: [id number] [address] AT [facility] [phone] [facility] [phone]      Last office visit with prescribing clinician: 6/2/2023   Last telemedicine visit with prescribing clinician: Visit date not found   Next office visit with prescribing clinician: 3/4/2024     Additional details provided by patient: PATIENT ONLY HAS 1 LEFT.     Does the patient have less than a 3 day supply:  [x] Yes  [] No    Would you like a call back once the refill request has been completed: [] Yes [] No    If the office needs to give you a call back, can they leave a voicemail: [] Yes [] No    Risa Banuelos Rep   12/06/23 10:59 EST           
Nephrology/Event Note
Nutrition Services
chest tubes and wire removal/Event Note

## 2023-12-22 ENCOUNTER — TELEPHONE (OUTPATIENT)
Dept: GASTROENTEROLOGY | Facility: CLINIC | Age: 68
End: 2023-12-22

## 2023-12-22 NOTE — TELEPHONE ENCOUNTER
Caller: Khai Slater    Relationship to patient: Self    Best call back number: [phone]     Patient is needing: RESCHEDULE COLONOSCOPY

## 2023-12-22 NOTE — TELEPHONE ENCOUNTER
Caller: Khai Slater    Relationship to patient: Self    Best call back number: [phone]     Patient is needing: RESCHEDULE COLONOSCOPY

## 2023-12-28 RX ORDER — LISINOPRIL 5 MG/1
5 TABLET ORAL DAILY
Qty: 90 TABLET | Refills: 3 | Status: SHIPPED | OUTPATIENT
Start: 2023-12-28

## 2023-12-28 RX ORDER — CLOPIDOGREL BISULFATE 75 MG/1
75 TABLET ORAL DAILY
Qty: 90 TABLET | Refills: 3 | Status: SHIPPED | OUTPATIENT
Start: 2023-12-28

## 2023-12-28 NOTE — TELEPHONE ENCOUNTER
Rx Refill Note  Requested Prescriptions     Pending Prescriptions Disp Refills    lisinopril (PRINIVIL,ZESTRIL) 5 MG tablet [Pharmacy Med Name: LISINOPRIL 5 MG TABLET] 90 tablet 3     Sig: TAKE ONE TABLET BY MOUTH DAILY    clopidogrel (PLAVIX) 75 MG tablet [Pharmacy Med Name: CLOPIDOGREL 75 MG TABLET] 90 tablet 3     Sig: TAKE ONE TABLET BY MOUTH DAILY      Last office visit with prescribing clinician: 5/24/2023   Last telemedicine visit with prescribing clinician: Visit date not found   Next office visit with prescribing clinician: Visit date not found                         Would you like a call back once the refill request has been completed: [] Yes [] No    If the office needs to give you a call back, can they leave a voicemail: [] Yes [] No    Marissa Archibald MA  12/28/23, 07:52 EST

## 2024-01-26 ENCOUNTER — TELEPHONE (OUTPATIENT)
Dept: GASTROENTEROLOGY | Facility: CLINIC | Age: 69
End: 2024-01-26
Payer: MEDICARE

## 2024-01-26 NOTE — TELEPHONE ENCOUNTER
Ok hub to read set up colonoscopy for 5/28/24 to arrive at 12:30 I mailed and mycharted [employer] prep info

## 2024-01-26 NOTE — TELEPHONE ENCOUNTER
Caller: Khai Slater    Relationship to patient: Self    Best call back number: [phone]    Type of visit: COLONOSCOPY    Requested date: SOON AS POSSIBLE.     If rescheduling, when is the original appointment: 9/22/23     Additional notes: ORIGINAL MESSAGE 12/22/23

## 2024-03-04 ENCOUNTER — OFFICE VISIT (OUTPATIENT)
Dept: INTERNAL MEDICINE | Facility: CLINIC | Age: 69
End: 2024-03-04
Payer: MEDICARE

## 2024-03-04 VITALS
OXYGEN SATURATION: 96 % | HEIGHT: 69 IN | RESPIRATION RATE: 16 BRPM | BODY MASS INDEX: 28.53 KG/M2 | SYSTOLIC BLOOD PRESSURE: 114 MMHG | TEMPERATURE: 97.1 F | HEART RATE: 61 BPM | DIASTOLIC BLOOD PRESSURE: 64 MMHG | WEIGHT: 192.6 LBS

## 2024-03-04 DIAGNOSIS — R73.03 PREDIABETES: ICD-10-CM

## 2024-03-04 DIAGNOSIS — M54.2 CHRONIC NECK PAIN: ICD-10-CM

## 2024-03-04 DIAGNOSIS — G89.29 CHRONIC NECK PAIN: ICD-10-CM

## 2024-03-04 DIAGNOSIS — G89.29 CHRONIC PAIN OF RIGHT KNEE: ICD-10-CM

## 2024-03-04 DIAGNOSIS — Z12.5 SCREENING FOR MALIGNANT NEOPLASM OF PROSTATE: ICD-10-CM

## 2024-03-04 DIAGNOSIS — G89.29 CHRONIC MIDLINE LOW BACK PAIN WITHOUT SCIATICA: ICD-10-CM

## 2024-03-04 DIAGNOSIS — K76.0 HEPATIC STEATOSIS: ICD-10-CM

## 2024-03-04 DIAGNOSIS — N48.6 PEYRONIE'S DISEASE: ICD-10-CM

## 2024-03-04 DIAGNOSIS — M51.36 LUMBAR DEGENERATIVE DISC DISEASE: ICD-10-CM

## 2024-03-04 DIAGNOSIS — R10.13 DYSPEPSIA: ICD-10-CM

## 2024-03-04 DIAGNOSIS — Z95.1 HX OF CABG: ICD-10-CM

## 2024-03-04 DIAGNOSIS — I10 PRIMARY HYPERTENSION: ICD-10-CM

## 2024-03-04 DIAGNOSIS — M54.50 CHRONIC MIDLINE LOW BACK PAIN WITHOUT SCIATICA: ICD-10-CM

## 2024-03-04 DIAGNOSIS — K40.90 RIGHT INGUINAL HERNIA: ICD-10-CM

## 2024-03-04 DIAGNOSIS — Z98.1 HISTORY OF FUSION OF CERVICAL SPINE: ICD-10-CM

## 2024-03-04 DIAGNOSIS — E78.2 MIXED HYPERLIPIDEMIA: ICD-10-CM

## 2024-03-04 DIAGNOSIS — Z00.00 ENCOUNTER FOR SUBSEQUENT ANNUAL WELLNESS VISIT (AWV) IN MEDICARE PATIENT: Primary | ICD-10-CM

## 2024-03-04 DIAGNOSIS — M25.561 CHRONIC PAIN OF RIGHT KNEE: ICD-10-CM

## 2024-03-04 DIAGNOSIS — Z85.47 HISTORY OF TESTICULAR CANCER: ICD-10-CM

## 2024-03-04 DIAGNOSIS — I25.810 CORONARY ARTERY DISEASE INVOLVING CORONARY BYPASS GRAFT OF NATIVE HEART WITHOUT ANGINA PECTORIS: ICD-10-CM

## 2024-03-04 NOTE — PROGRESS NOTES
The ABCs of the Annual Wellness Visit  Subsequent Medicare Wellness Visit    Subjective      Khai Slater is a 69 y.o. male who presents for a Subsequent Medicare Wellness Visit.    Chief complaint: Medicare wellness visit     No acute complaints.     Hypertension well-controlled on low-dose lisinopril with no side effects.    Hyperlipidemia tolerating statin well    Coronary artery disease status post PCI and CABG, no chest pain or shortness of breath.  Follows with cardiology annually.      Dyspepsia controlled on pantoprazole with EGD showing Furnas grade a esophagitis 2021.  EGD and colonoscopy scheduled for later this year. Family history of colon polyps and personal history of polyps.     Prediabetes, no recent exercise or diet changes.     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     Pterygium of the left eye not causing any visual difficulties.       History of testicular cancer status post orchiectomy, follows with Dr. Castillo with urology annually.     History of fusion of cervical spine, occasionally he gets episodes of neck pain but he has been able to manage these with ibuprofen as needed.     Distant history of hepatitis when in the , biopsy was negative, may been related to hydrazine exposure.  [place] has denied his [place] for this. LFTs have been normal recently.     Right inguinal hernia, no discomfort, not enlarging.  No discomfort when walking at [employer] last week. Just monitoring for now.     Lives with wife.  3 children, 10 grandchildren. 1 pack year former smoker, AAA screening by proxy CT abdomen and pelvis in 2020 was unremarkable.  Minimal alcohol use.  Not currently exercising regularly but has been active otherwise. No recent dietary changes, tries to choose healthy foods. Colon cancer screening 2021 with 2-year interval, scheduled.  Silvigen  flew F-15's, F-16's, then worked for [employer], now a .  Has a living will not on file.  Discussed Shingrix  No. 2, COVID, RSV vaccine when interested     The following portions of the patient's history were reviewed and   updated as appropriate: allergies, current medications, past family history, past medical history, past social history, past surgical history, and problem list.    Compared to one year ago, the patient feels his physical   health is the same.    Compared to one year ago, the patient feels his mental   health is the same.    Recent Hospitalizations:  He was not admitted to the hospital during the last year.       Current Medical Providers:  Patient Care Team:  Janak Wills MD as PCP - General (Family Medicine)    Outpatient Medications Prior to Visit   Medication Sig Dispense Refill    atorvastatin (LIPITOR) 40 MG tablet Take 1 tablet by mouth Daily. 90 tablet 3    carvedilol (COREG) 6.25 MG tablet Take 1 tablet by mouth 2 (Two) Times a Day. 180 tablet 3    clopidogrel (PLAVIX) 75 MG tablet TAKE ONE TABLET BY MOUTH DAILY 90 tablet 3    Coenzyme Q10 (CO Q 10 PO) Take 1 tablet by mouth Daily.      lisinopril (PRINIVIL,ZESTRIL) 5 MG tablet TAKE ONE TABLET BY MOUTH DAILY 90 tablet 3    nitroglycerin (NITROSTAT) 0.4 MG SL tablet 1 under the tongue as needed for angina, may repeat q5mins for up three doses call 911 if not resolved 25 tablet 3    pantoprazole (PROTONIX) 40 MG EC tablet Take 1 tablet by mouth Daily. 90 tablet 3     Facility-Administered Medications Prior to Visit   Medication Dose Route Frequency Provider Last Rate Last Admin    methylPREDNISolone sodium succinate (SOLU-Medrol) injection 40 mg  40 mg Intravenous Once Courtney Vuong, APRN           No opioid medication identified on active medication list. I have reviewed chart for other potential  high risk medication/s and harmful drug interactions in the elderly.        Aspirin is not on active medication list.  Aspirin use is not indicated based on review of current medical condition/s. Risk of harm outweighs potential benefits.   ".    Patient Active Problem List   Diagnosis    Hyperlipemia    History of hepatitis    Coronary artery disease involving coronary bypass graft of native heart without angina pectoris    Hypertension    Prediabetes    Dyspepsia    Colon polyp    Family history of colonic polyps    Pterygium of left eye    Hx of CABG    Peyronie's disease    History of testicular cancer    History of fusion of cervical spine    Right inguinal hernia    Hepatic steatosis    Chronic neck pain    Chronic pain of right knee    History of liver biopsy    Chronic midline low back pain without sciatica    Lumbar degenerative disc disease     Advance Care Planning   Advance Care Planning     Advance Directive is not on file.  ACP discussion was held with the patient during this visit. Patient has an advance directive (not in EMR), copy requested.     Objective    Vitals:    03/04/24 0743   BP: 114/64   BP Location: Left arm   Patient Position: Sitting   Cuff Size: Adult   Pulse: 61   Resp: 16   Temp: 97.1 °F (36.2 °C)   TempSrc: Infrared   SpO2: 96%   Weight: 87.4 kg (192 lb 9.6 oz)   Height: 175.3 cm (69\")     Estimated body mass index is 28.44 kg/m² as calculated from the following:    Height as of this encounter: 175.3 cm (69\").    Weight as of this encounter: 87.4 kg (192 lb 9.6 oz).           Does the patient have evidence of cognitive impairment?   No            HEALTH RISK ASSESSMENT    Smoking Status:  Social History     Tobacco Use   Smoking Status Never   Smokeless Tobacco Never   Tobacco Comments    CAFFEINE USE     Alcohol Consumption:  Social History     Substance and Sexual Activity   Alcohol Use Yes    Alcohol/week: 1.0 standard drink of alcohol    Types: 1 Cans of beer per week    Comment: occassionally     Fall Risk Screen:    JAREDADI Fall Risk Assessment was completed, and patient is at LOW risk for falls.Assessment completed on:3/4/2024    Depression Screening:      3/4/2024     7:50 AM   PHQ-2/PHQ-9 Depression Screening "   Little Interest or Pleasure in Doing Things 0-->not at all   Feeling Down, Depressed or Hopeless 0-->not at all   PHQ-9: Brief Depression Severity Measure Score 0       Health Habits and Functional and Cognitive Screenin/28/2023     8:29 AM   Functional & Cognitive Status   Do you have difficulty preparing food and eating? No   Do you have difficulty bathing yourself, getting dressed or grooming yourself? No   Do you have difficulty using the toilet? No   Do you have difficulty moving around from place to place? No   Do you have trouble with steps or getting out of a bed or a chair? No   Do you need help using the phone?  No   Are you deaf or do you have serious difficulty hearing?  No   Do you need help to go to places out of walking distance? No   Do you need help shopping? No   Do you need help preparing meals?  No   Do you need help with housework?  No   Do you need help with laundry? No   Do you need help taking your medications? No   Do you need help managing money? No   Do you ever drive or ride in a car without wearing a seat belt? No   Do you have difficulty concentrating, remembering or making decisions? No       Age-appropriate Screening Schedule:  Refer to the list below for future screening recommendations based on patient's age, sex and/or medical conditions. Orders for these recommended tests are listed in the plan section. The patient has been provided with a written plan.    Health Maintenance   Topic Date Due    RSV Vaccine - Adults (1 - 1-dose 60+ series) Never done    Pneumococcal Vaccine 65+ (2 of 2 - PCV) 2023    COLORECTAL CANCER SCREENING  2023    ZOSTER VACCINE (2 of 2) 2023    INFLUENZA VACCINE  2023    LIPID PANEL  2024    ANNUAL WELLNESS VISIT  2024    BMI FOLLOWUP  2024    TDAP/TD VACCINES (3 - Td or Tdap) 2033    HEPATITIS C SCREENING  Completed    COVID-19 Vaccine  Discontinued                  CMS Preventative Services Quick  Reference  Risk Factors Identified During Encounter:    Immunizations Discussed/Encouraged: Shingrix, COVID19, and RSV (Respiratory Syncytial Virus)  Inactivity/Sedentary: Patient was advised to exercise at least 150 minutes a week per CDC recommendations.    The above risks/problems have been discussed with the patient.  Pertinent information has been shared with the patient in the After Visit Summary.    Very pleasant 69-year-old male with coronary artery disease status post CABG, testicular cancer status post orchiectomy, dyspepsia with history of colon polyps, hypertension, hyperlipidemia, prediabetes, chronic neck and lower back pain, history of cervical fusion, among other problems, who presents with the following:      Diagnoses and all orders for this visit:    1. Encounter for subsequent annual wellness visit (AWV) in Medicare patient (Primary): We discussed preventative care including age and patient-appropriate immunizations and cancer screening. We also discussed the importance of exercise and a healthy diet. This is their annual preventative exam.    2. Primary hypertension: Controlled  -     Urinalysis With Microscopic - Urine, Clean Catch    3. Mixed hyperlipidemia: Tolerating statin well  -     CBC & Differential  -     Comprehensive Metabolic Panel  -     Lipid Panel  -     Thyroid Panel With TSH    4. Hx of CABG: No chest pain or angina.  Continue to follow with cardiology routinely.  On clopidogrel monotherapy.  5. Coronary artery disease involving coronary bypass graft of native heart without angina pectoris    6. Prediabetes  -     Hemoglobin A1c    7. Dyspepsia: Controlled with PPI    8. Right inguinal hernia: Stable without any discomfort, continued observation    9. Hepatic steatosis: LFTs have been normal but we try to avoid medications that risk liver injury    10. History of testicular cancer: Continue to follow with urology annually    11. Peyronie's disease: Did not improve with  injection    12. Lumbar degenerative disc disease: Stable  13. Chronic midline low back pain without sciatica  14. History of fusion of cervical spine  15. Chronic neck pain  16. Chronic pain of right knee    17. Screening for malignant neoplasm of prostate    Follow Up:   Next Medicare Wellness visit to be scheduled in 1 year.      An After Visit Summary and PPPS were made available to the patient.

## 2024-03-05 LAB
ALBUMIN SERPL-MCNC: 4.3 G/DL (ref 3.9–4.9)
ALBUMIN/GLOB SERPL: 2 {RATIO} (ref 1.2–2.2)
ALP SERPL-CCNC: 89 IU/L (ref 44–121)
ALT SERPL-CCNC: 36 IU/L (ref 0–44)
APPEARANCE UR: CLEAR
AST SERPL-CCNC: 27 IU/L (ref 0–40)
BACTERIA #/AREA URNS HPF: NORMAL /[HPF]
BASOPHILS # BLD AUTO: 0.1 X10E3/UL (ref 0–0.2)
BASOPHILS NFR BLD AUTO: 2 %
BILIRUB SERPL-MCNC: 0.6 MG/DL (ref 0–1.2)
BILIRUB UR QL STRIP: NEGATIVE
BUN SERPL-MCNC: 15 MG/DL (ref 8–27)
BUN/CREAT SERPL: 15 (ref 10–24)
CALCIUM SERPL-MCNC: 9.3 MG/DL (ref 8.6–10.2)
CASTS URNS QL MICRO: NORMAL /LPF
CHLORIDE SERPL-SCNC: 105 MMOL/L (ref 96–106)
CHOLEST SERPL-MCNC: 144 MG/DL (ref 100–199)
CO2 SERPL-SCNC: 22 MMOL/L (ref 20–29)
COLOR UR: YELLOW
CREAT SERPL-MCNC: 1.03 MG/DL (ref 0.76–1.27)
EGFRCR SERPLBLD CKD-EPI 2021: 79 ML/MIN/1.73
EOSINOPHIL # BLD AUTO: 0.6 X10E3/UL (ref 0–0.4)
EOSINOPHIL NFR BLD AUTO: 12 %
EPI CELLS #/AREA URNS HPF: NORMAL /HPF (ref 0–10)
ERYTHROCYTE [DISTWIDTH] IN BLOOD BY AUTOMATED COUNT: 12.7 % (ref 11.6–15.4)
FT4I SERPL CALC-MCNC: 1.5 (ref 1.2–4.9)
GLOBULIN SER CALC-MCNC: 2.2 G/DL (ref 1.5–4.5)
GLUCOSE SERPL-MCNC: 121 MG/DL (ref 70–99)
GLUCOSE UR QL STRIP: NEGATIVE
HBA1C MFR BLD: 5.9 % (ref 4.8–5.6)
HCT VFR BLD AUTO: 44.9 % (ref 37.5–51)
HDLC SERPL-MCNC: 49 MG/DL
HGB BLD-MCNC: 15.2 G/DL (ref 13–17.7)
HGB UR QL STRIP: NEGATIVE
IMM GRANULOCYTES # BLD AUTO: 0 X10E3/UL (ref 0–0.1)
IMM GRANULOCYTES NFR BLD AUTO: 0 %
KETONES UR QL STRIP: NEGATIVE
LDLC SERPL CALC-MCNC: 79 MG/DL (ref 0–99)
LEUKOCYTE ESTERASE UR QL STRIP: NEGATIVE
LYMPHOCYTES # BLD AUTO: 2.1 X10E3/UL (ref 0.7–3.1)
LYMPHOCYTES NFR BLD AUTO: 37 %
MCH RBC QN AUTO: 30.4 PG (ref 26.6–33)
MCHC RBC AUTO-ENTMCNC: 33.9 G/DL (ref 31.5–35.7)
MCV RBC AUTO: 90 FL (ref 79–97)
MICRO URNS: NORMAL
MICRO URNS: NORMAL
MONOCYTES # BLD AUTO: 0.6 X10E3/UL (ref 0.1–0.9)
MONOCYTES NFR BLD AUTO: 11 %
NEUTROPHILS # BLD AUTO: 2.1 X10E3/UL (ref 1.4–7)
NEUTROPHILS NFR BLD AUTO: 38 %
NITRITE UR QL STRIP: NEGATIVE
PH UR STRIP: 5.5 [PH] (ref 5–7.5)
PLATELET # BLD AUTO: 193 X10E3/UL (ref 150–450)
POTASSIUM SERPL-SCNC: 4.5 MMOL/L (ref 3.5–5.2)
PROT SERPL-MCNC: 6.5 G/DL (ref 6–8.5)
PROT UR QL STRIP: NEGATIVE
RBC # BLD AUTO: 5 X10E6/UL (ref 4.14–5.8)
RBC #/AREA URNS HPF: NORMAL /HPF (ref 0–2)
SODIUM SERPL-SCNC: 139 MMOL/L (ref 134–144)
SP GR UR STRIP: 1.02 (ref 1–1.03)
T3RU NFR SERPL: 25 % (ref 24–39)
T4 SERPL-MCNC: 5.8 UG/DL (ref 4.5–12)
TRIGL SERPL-MCNC: 81 MG/DL (ref 0–149)
TSH SERPL DL<=0.005 MIU/L-ACNC: 6.34 UIU/ML (ref 0.45–4.5)
UROBILINOGEN UR STRIP-MCNC: 0.2 MG/DL (ref 0.2–1)
VLDLC SERPL CALC-MCNC: 16 MG/DL (ref 5–40)
WBC # BLD AUTO: 5.5 X10E3/UL (ref 3.4–10.8)
WBC #/AREA URNS HPF: NORMAL /HPF (ref 0–5)

## 2024-03-12 DIAGNOSIS — I25.810 CORONARY ARTERY DISEASE INVOLVING CORONARY BYPASS GRAFT OF NATIVE HEART WITHOUT ANGINA PECTORIS: ICD-10-CM

## 2024-03-12 DIAGNOSIS — E78.2 MIXED HYPERLIPIDEMIA: Primary | ICD-10-CM

## 2024-03-12 PROBLEM — E03.8 SUBCLINICAL HYPOTHYROIDISM: Status: ACTIVE | Noted: 2024-03-12

## 2024-03-12 RX ORDER — ATORVASTATIN CALCIUM 40 MG/1
60 TABLET, FILM COATED ORAL DAILY
Qty: 135 TABLET | Refills: 3 | Status: SHIPPED | OUTPATIENT
Start: 2024-03-12

## 2024-03-26 RX ORDER — CARVEDILOL 6.25 MG/1
6.25 TABLET ORAL 2 TIMES DAILY
Qty: 180 TABLET | Refills: 3 | Status: SHIPPED | OUTPATIENT
Start: 2024-03-26

## 2024-04-29 ENCOUNTER — TELEPHONE (OUTPATIENT)
Dept: INTERNAL MEDICINE | Facility: CLINIC | Age: 69
End: 2024-04-29

## 2024-04-29 ENCOUNTER — OFFICE VISIT (OUTPATIENT)
Dept: INTERNAL MEDICINE | Facility: CLINIC | Age: 69
End: 2024-04-29
Payer: MEDICARE

## 2024-04-29 VITALS
WEIGHT: 192.68 LBS | DIASTOLIC BLOOD PRESSURE: 60 MMHG | SYSTOLIC BLOOD PRESSURE: 114 MMHG | OXYGEN SATURATION: 97 % | BODY MASS INDEX: 28.54 KG/M2 | HEIGHT: 69 IN | TEMPERATURE: 96.9 F | HEART RATE: 71 BPM

## 2024-04-29 DIAGNOSIS — J18.9 COMMUNITY ACQUIRED PNEUMONIA OF RIGHT LOWER LOBE OF LUNG: Primary | ICD-10-CM

## 2024-04-29 PROCEDURE — 99214 OFFICE O/P EST MOD 30 MIN: CPT | Performed by: FAMILY MEDICINE

## 2024-04-29 PROCEDURE — 3078F DIAST BP <80 MM HG: CPT | Performed by: FAMILY MEDICINE

## 2024-04-29 PROCEDURE — 3074F SYST BP LT 130 MM HG: CPT | Performed by: FAMILY MEDICINE

## 2024-04-29 RX ORDER — LEVOFLOXACIN 750 MG/1
750 TABLET, FILM COATED ORAL DAILY
Qty: 7 TABLET | Refills: 0 | Status: SHIPPED | OUTPATIENT
Start: 2024-04-29 | End: 2024-05-06

## 2024-04-29 RX ORDER — PREDNISONE 10 MG/1
TABLET ORAL
Qty: 28 TABLET | Refills: 0 | Status: SHIPPED | OUTPATIENT
Start: 2024-04-29 | End: 2024-05-10

## 2024-04-29 NOTE — TELEPHONE ENCOUNTER
Caller: Khai Slater    Relationship: Self    Best call back number: [phone]     What medication are you requesting: STEROID PACK     If a prescription is needed, what is your preferred pharmacy and phone number: [facility] #[id number] [place] [id number] [facility] AT [facility] [phone] [facility] [phone]      Additional notes: WAS JUST SEEN IN OFFICE AND GIVEN AN ANTIBIOTIC BUT WANTING TO KNOW IF HE CAN ADD A STEROID PACK TO THAT

## 2024-04-29 NOTE — PROGRESS NOTES
Date of Encounter: 2024  Patient: Khai Slater,  1955    Subjective   History of Presenting Illness  Chief complaint: 1 month of cough    1 month of cough preceded by unspecified viral illness that his grandchildren brought home from .  Initially he had more upper airway congestion, that has improved but he still has a persistent cough that is nonproductive and continues to wake him up at night.  He denies any wheezing, fever, but does endorse some occasional chills.  No nausea, vomiting, or diarrhea.  He is using Afrin sparingly for this without significant relief.  He does not have any chest pain, lightheadedness or dizziness.  He has been more fatigued and has not been able to exert himself as much is typically.    The following portions of the patient's history were reviewed and updated as appropriate: allergies, current medications, past family history, past medical history, past social history, past surgical history and problem list.    Patient Active Problem List   Diagnosis    Hyperlipemia    History of hepatitis    Coronary artery disease involving coronary bypass graft of native heart without angina pectoris    Hypertension    Prediabetes    Dyspepsia    Colon polyp    Family history of colonic polyps    Pterygium of left eye    Hx of CABG    Peyronie's disease    History of testicular cancer    History of fusion of cervical spine    Right inguinal hernia    Hepatic steatosis    Chronic neck pain    Chronic pain of right knee    History of liver biopsy    Chronic midline low back pain without sciatica    Lumbar degenerative disc disease    Subclinical hypothyroidism     Past Medical History:   Diagnosis Date    Acute non-ST-elevation myocardial infarction 3/4/2016    Description:  tx with Taxus stent to dis circ    CAD (coronary artery disease)     Cancer     testicular    Coronary arteriosclerosis in native artery 3/4/2016    Description: due to FAA cath in  restenosis in  circ and new lesion in LAD tx with cypher stents; 9/12 no sx but due to FAA relooked and CABG LIMA to LAD and SVG to circ by Dr Goode; 5/13 recath no sx and LIMA atretic and SVG to circ gone    GERD (gastroesophageal reflux disease)     Hyperlipidemia     Hypertension     NSTEMI (non-ST elevated myocardial infarction)      Past Surgical History:   Procedure Laterality Date    CARDIAC CATHETERIZATION      COLONOSCOPY      COLONOSCOPY N/A 3/24/2021    Procedure: COLONOSCOPY INTO CECUM AND T.I WITH SALINE LIFT COLD SNARE POLYPECTOMY  WITH CLIP PLACEMENT X1 IN CECUM AND COLD BIOPSY POLYPECTOMIES;  Surgeon: Shaq Quiñones MD;  Location: [facility] END[facility]COPY;  Service: Gastroenterology;  Laterality: N/A;  PRE- HISTORY OF COLON POLYPS, PERSONAL HISTORY OF COLON POLYPS  POST- POLYPS, INTERNAL HEMORRHOIDS    CORONARY ANGIOPLASTY WITH STENT PLACEMENT      5/2/2013; 70% stenosis in the LIMA to the LAD, % occluded, proximal LAD has 90% in stent stenosis, 100% distal left circumflex, 20% diffuse disease of the RCA.    CORONARY ARTERY BYPASS GRAFT      ENDOSCOPY N/A 3/24/2021    Procedure: ESOPHAGOGASTRODUODENOSCOPY WITH COLD BIOPSIES;  Surgeon: Shaq Quiñones MD;  Location: [facility] ENDOSCOPY;  Service: Gastroenterology;  Laterality: N/A;  PRE- DYSPEPSIA  POST- ESOPHAGITIS, GASTRITIS    ORCHIECTOMY Left      Family History   Problem Relation Age of Onset    Heart disease Mother     Heart attack Mother     Hypertension Father     No Known Problems Maternal Grandmother     Diabetes Maternal Grandfather     No Known Problems Paternal Grandmother     No Known Problems Paternal Grandfather        Current Outpatient Medications:     atorvastatin (LIPITOR) 40 MG tablet, Take 1.5 tablets by mouth Daily., Disp: 135 tablet, Rfl: 3    carvedilol (COREG) 6.25 MG tablet, TAKE ONE TABLET BY MOUTH TWICE A DAY, Disp: 180 tablet, Rfl: 3    clopidogrel (PLAVIX) 75 MG tablet, TAKE ONE TABLET BY MOUTH DAILY, Disp: 90 tablet, Rfl: 3    Coenzyme Q10  "(CO Q 10 PO), Take 1 tablet by mouth Daily., Disp: , Rfl:     lisinopril (PRINIVIL,ZESTRIL) 5 MG tablet, TAKE ONE TABLET BY MOUTH DAILY, Disp: 90 tablet, Rfl: 3    nitroglycerin (NITROSTAT) 0.4 MG SL tablet, 1 under the tongue as needed for angina, may repeat q5mins for up three doses call 911 if not resolved, Disp: 25 tablet, Rfl: 3    pantoprazole (PROTONIX) 40 MG EC tablet, Take 1 tablet by mouth Daily., Disp: 90 tablet, Rfl: 3    levoFLOXacin (Levaquin) 750 MG tablet, Take 1 tablet by mouth Daily for 7 days., Disp: 7 tablet, Rfl: 0  No Known Allergies  Social History     Tobacco Use    Smoking status: Never    Smokeless tobacco: Never    Tobacco comments:     CAFFEINE USE   Vaping Use    Vaping status: Never Used   Substance Use Topics    Alcohol use: Yes     Alcohol/week: 1.0 standard drink of alcohol     Types: 1 Cans of beer per week     Comment: occassionally    Drug use: No          Objective   Physical Exam  Vitals:    04/29/24 1513   BP: 114/60   BP Location: Left arm   Patient Position: Sitting   Cuff Size: Adult   Pulse: 71   Temp: 96.9 °F (36.1 °C)   TempSrc: Infrared   SpO2: 97%   Weight: 87.4 kg (192 lb 10.9 oz)   Height: 175.3 cm (69\")     Body mass index is 28.45 kg/m².    Constitutional: NAD.  Eyes: EOMI. PERRLA. Normal conjunctiva.  Ear, nose, mouth, throat: Moderate postnasal drip. no tonsillar exudates or erythema.   Normal nasal mucosa. Normal external ear canals and TMs bilaterally.  No sinus tenderness to palpation.  Cardiovascular: RRR. No murmurs. No LE edema b/l. Radial pulses 2+ bilaterally.  Pulmonary: Crackles in the right lower lung base. These do improve after a series of deep breathing and deep coughing but are still present.  Good effort.  Integumentary: No rashes or wounds on face or upper extremities.  Lymphatic: No anterior cervical lymphadenopathy.  Endocrine: No thyromegaly or palpable thyroid nodules.  Psychiatric: Normal affect. Normal thought content.     Assessment & Plan "   Assessment and Plan  Very pleasant 69-year-old male with coronary artery disease status post CABG, testicular cancer status post orchiectomy, dyspepsia with history of colon polyps, hypertension, hyperlipidemia, prediabetes, chronic neck and lower back pain, history of cervical fusion, among other problems, who presents with the following:      Diagnoses and all orders for this visit:    1. Community acquired pneumonia of right lower lobe of lung (Primary)  -     levoFLOXacin (Levaquin) 750 MG tablet; Take 1 tablet by mouth Daily for 7 days.  Dispense: 7 tablet; Refill: 0    Based on examination and history recommend treating as community-acquired pneumonia left with levofloxacin.  Discussed the risks and benefits of this antibiotic.  He does fly out of town on Thursday or Friday to go to [facility] [place].  Since he is not hypoxemic today and his exam is fairly mild, I think it is reasonable to do this as long as he is feeling better.  If he is not feeling better recommend he call us for chest x-ray and consideration of further evaluation.    Janak Wills MD  Family Medicine  O: [phone]    Disclaimer: Parts of this note were dictated by speech recognition. Minor errors in transcription may be present. Please call if questions.

## 2024-05-28 ENCOUNTER — ANESTHESIA (OUTPATIENT)
Dept: GASTROENTEROLOGY | Facility: HOSPITAL | Age: 69
End: 2024-05-28
Payer: MEDICARE

## 2024-05-28 ENCOUNTER — HOSPITAL ENCOUNTER (OUTPATIENT)
Facility: HOSPITAL | Age: 69
Setting detail: HOSPITAL OUTPATIENT SURGERY
Discharge: HOME | End: 2024-05-28
Attending: INTERNAL MEDICINE | Admitting: INTERNAL MEDICINE
Payer: MEDICARE

## 2024-05-28 ENCOUNTER — ANESTHESIA EVENT (OUTPATIENT)
Dept: GASTROENTEROLOGY | Facility: HOSPITAL | Age: 69
End: 2024-05-28
Payer: MEDICARE

## 2024-05-28 VITALS
BODY MASS INDEX: 28.44 KG/M2 | WEIGHT: 192 LBS | HEART RATE: 52 BPM | SYSTOLIC BLOOD PRESSURE: 124 MMHG | HEIGHT: 69 IN | OXYGEN SATURATION: 96 % | RESPIRATION RATE: 16 BRPM | DIASTOLIC BLOOD PRESSURE: 81 MMHG

## 2024-05-28 DIAGNOSIS — K63.5 COLON POLYP: ICD-10-CM

## 2024-05-28 DIAGNOSIS — Z83.719 FAMILY HISTORY OF COLONIC POLYPS: ICD-10-CM

## 2024-05-28 PROCEDURE — 25010000002 PROPOFOL 1000 MG/100ML EMULSION

## 2024-05-28 PROCEDURE — 45380 COLONOSCOPY AND BIOPSY: CPT | Performed by: INTERNAL MEDICINE

## 2024-05-28 PROCEDURE — 88305 TISSUE EXAM BY PATHOLOGIST: CPT | Performed by: INTERNAL MEDICINE

## 2024-05-28 PROCEDURE — 25810000003 LACTATED RINGERS PER 1000 ML: Performed by: INTERNAL MEDICINE

## 2024-05-28 RX ORDER — SODIUM CHLORIDE, SODIUM LACTATE, POTASSIUM CHLORIDE, CALCIUM CHLORIDE 600; 310; 30; 20 MG/100ML; MG/100ML; MG/100ML; MG/100ML
1000 INJECTION, SOLUTION INTRAVENOUS CONTINUOUS
Status: DISCONTINUED | OUTPATIENT
Start: 2024-05-28 | End: 2024-05-28 | Stop reason: HOSPADM

## 2024-05-28 RX ORDER — LIDOCAINE HYDROCHLORIDE 20 MG/ML
INJECTION, SOLUTION INFILTRATION; PERINEURAL AS NEEDED
Status: DISCONTINUED | OUTPATIENT
Start: 2024-05-28 | End: 2024-05-28 | Stop reason: SURG

## 2024-05-28 RX ORDER — SODIUM CHLORIDE 0.9 % (FLUSH) 0.9 %
10 SYRINGE (ML) INJECTION AS NEEDED
Status: DISCONTINUED | OUTPATIENT
Start: 2024-05-28 | End: 2024-05-28 | Stop reason: HOSPADM

## 2024-05-28 RX ORDER — PROPOFOL 10 MG/ML
INJECTION, EMULSION INTRAVENOUS CONTINUOUS PRN
Status: DISCONTINUED | OUTPATIENT
Start: 2024-05-28 | End: 2024-05-28 | Stop reason: SURG

## 2024-05-28 RX ADMIN — SODIUM CHLORIDE, POTASSIUM CHLORIDE, SODIUM LACTATE AND CALCIUM CHLORIDE 1000 ML: 600; 310; 30; 20 INJECTION, SOLUTION INTRAVENOUS at 13:10

## 2024-05-28 RX ADMIN — PROPOFOL INJECTABLE EMULSION 100 MG: 10 INJECTION, EMULSION INTRAVENOUS at 13:58

## 2024-05-28 RX ADMIN — PROPOFOL INJECTABLE EMULSION 160 MCG/KG/MIN: 10 INJECTION, EMULSION INTRAVENOUS at 13:59

## 2024-05-28 RX ADMIN — LIDOCAINE HYDROCHLORIDE 100 MG: 20 INJECTION, SOLUTION INFILTRATION; PERINEURAL at 13:58

## 2024-05-28 NOTE — DISCHARGE INSTRUCTIONS
For the rest of the day patient needs to be with a responsible adult.    For the rest of the day DO NOT work, drive, operate heavy machinery, drink alcohol, make important decisions or sign legal documents.    Advance to regular diet as tolerated, if your stomach is upset start with a light/bland diet.    Follow recommendations on procedure report if provided by your doctor.    Call Dr Quiñones for problems [phone]    If these problems occur come to ER: large amounts of bleeding, trouble breathing, repeated vomiting, severe unrelieved pain, fever or chills.

## 2024-05-28 NOTE — H&P
[facility]  Pre Procedure History & Physical    Chief Complaint:   Time for my colonoscopy    Subjective     HPI:   69 y.o. male retired from [employer] with a h/o colon polyps.  His mom and dad also had colon polyps.  He last had a colonoscopy in 3 of 2021.    Past Medical History:   Past Medical History:   Diagnosis Date    Acute non-ST-elevation myocardial infarction 3/4/2016    Description: 7/07 tx with Taxus stent to dis circ    CAD (coronary artery disease)     Cancer     testicular    Coronary arteriosclerosis in native artery 3/4/2016    Description: due to FAA cath in 1/08 restenosis in circ and new lesion in LAD tx with cypher stents; 9/12 no sx but due to FAA relooked and CABG LIMA to LAD and SVG to circ by Dr Goode; 5/13 recath no sx and LIMA atretic and SVG to circ gone    GERD (gastroesophageal reflux disease)     Hyperlipidemia     Hypertension     NSTEMI (non-ST elevated myocardial infarction)        Family History:  Family History   Problem Relation Age of Onset    Heart disease Mother     Heart attack Mother     Hypertension Father     No Known Problems Maternal Grandmother     Diabetes Maternal Grandfather     No Known Problems Paternal Grandmother     No Known Problems Paternal Grandfather        Social History:   reports that he has never smoked. He has never used smokeless tobacco. He reports current alcohol use of about 1.0 standard drink of alcohol per week. He reports that he does not use drugs.    Medications:   Medications Prior to Admission   Medication Sig Dispense Refill Last Dose    atorvastatin (LIPITOR) 40 MG tablet Take 1.5 tablets by mouth Daily. 135 tablet 3 5/27/2024    carvedilol (COREG) 6.25 MG tablet TAKE ONE TABLET BY MOUTH TWICE A  tablet 3 5/27/2024 at 1800    Coenzyme Q10 (CO Q 10 PO) Take 1 tablet by mouth Daily.   5/27/2024    lisinopril (PRINIVIL,ZESTRIL) 5 MG tablet TAKE ONE TABLET BY MOUTH DAILY 90 tablet 3 5/27/2024     "pantoprazole (PROTONIX) 40 MG EC tablet Take 1 tablet by mouth Daily. 90 tablet 3 5/27/2024    clopidogrel (PLAVIX) 75 MG tablet TAKE ONE TABLET BY MOUTH DAILY 90 tablet 3 5/23/2024    nitroglycerin (NITROSTAT) 0.4 MG SL tablet 1 under the tongue as needed for angina, may repeat q5mins for up three doses call 911 if not resolved 25 tablet 3        Allergies:  Patient has no known allergies.    ROS:    Pertinent items are noted in HPI     Objective     Blood pressure 124/88, pulse 54, resp. rate 14, height 175.3 cm (69\"), weight 87.1 kg (192 lb), SpO2 96%.    Physical Exam   Constitutional: Pt is oriented to person, place, and time and well-developed, well-nourished, and in no distress.   HENT:   Mouth/Throat: Oropharynx is clear and moist.   Neck: Normal range of motion. Neck supple.   Cardiovascular: Normal rate, regular rhythm and normal heart sounds.    Pulmonary/Chest: Effort normal and breath sounds normal. No respiratory distress. No  wheezes.   Abdominal: Soft. Bowel sounds are normal.   Skin: Skin is warm and dry.   Psychiatric: Mood, memory, affect and judgment normal.     Assessment & Plan     Diagnosis:  69 y.o. male retired from [employer] with a h/o colon polyps.  His mom and dad also had colon polyps.  He last had a colonoscopy in 3 of 2021.    Anticipated Surgical Procedure:  Colonoscopy    The risks, benefits, and alternatives of this procedure have been discussed with the patient or the responsible party- the patient understands and agrees to proceed.    Shaq Quiñones M.D.   "

## 2024-05-28 NOTE — ANESTHESIA PREPROCEDURE EVALUATION
Anesthesia Evaluation     Patient summary reviewed and Nursing notes reviewed   NPO Solid Status: > 8 hours  NPO Liquid Status: > 2 hours           Airway   Mallampati: II  TM distance: >3 FB  Neck ROM: full  No difficulty expected  Dental - normal exam     Pulmonary - normal exam    breath sounds clear to auscultation  Cardiovascular - normal exam    ECG reviewed  Rhythm: regular  Rate: normal    (+) hypertension 2 medications or greater, past MI  >12 months, CAD, CABG >6 Months, cardiac stents Drug eluting stent more than 12 months ago , hyperlipidemia    ROS comment: CAD with hx NSTEMI and stents in 2007 and again in 2008, then CABG X2 in 2012 after cath required by FAA at that time/EF 64%, normal stress test 10/20    Neuro/Psych  GI/Hepatic/Renal/Endo    (+) GERD, liver disease fatty liver disease, thyroid problem hypothyroidism    Musculoskeletal     (+) back pain, chronic pain, neck pain      ROS comment: Lumbar DDD  Abdominal  - normal exam   Substance History      OB/GYN          Other   arthritis,   history of cancer      Other Comment: Testicular CA                Anesthesia Plan    ASA 3     MAC     intravenous induction     Anesthetic plan, risks, benefits, and alternatives have been provided, discussed and informed consent has been obtained with: patient.      CODE STATUS:

## 2024-05-28 NOTE — ANESTHESIA POSTPROCEDURE EVALUATION
Patient: Khai Slater    Procedure Summary       Date: 05/28/24 Room / Location: [facility] ENDOSCOPY 10 / [facility] ENDOSCOPY    Anesthesia Start: 1352 Anesthesia Stop: 1423    Procedure: COLONOSCOPY TO CECUM & T.I. WITH COLD POLYPECTOMIES Diagnosis:       Colon polyp      Family history of colonic polyps      (Colon polyp [K63.5])      (Family history of colonic polyps [Z83.71])    Surgeons: Shaq Quiñones MD Provider: Phillip Hudson MD    Anesthesia Type: MAC ASA Status: 3            Anesthesia Type: MAC    Vitals  Vitals Value Taken Time   /62 05/28/24 1422   Temp     Pulse 47 05/28/24 1432   Resp 14 05/28/24 1421   SpO2 96 % 05/28/24 1432   Vitals shown include unfiled device data.        Post Anesthesia Care and Evaluation    Patient location during evaluation: PACU  Patient participation: complete - patient participated  Level of consciousness: awake and alert  Pain management: adequate    Airway patency: patent  Anesthetic complications: No anesthetic complications    Cardiovascular status: acceptable  Respiratory status: acceptable  Hydration status: acceptable    Comments: --------------------            05/28/24               1421     --------------------   BP:       101/62     Pulse:      52       Resp:       14       SpO2:      96%      --------------------

## 2024-05-29 LAB
LAB AP CASE REPORT: NORMAL
PATH REPORT.FINAL DX SPEC: NORMAL
PATH REPORT.GROSS SPEC: NORMAL

## 2024-06-12 ENCOUNTER — TELEPHONE (OUTPATIENT)
Dept: GASTROENTEROLOGY | Facility: CLINIC | Age: 69
End: 2024-06-12

## 2024-06-12 ENCOUNTER — TELEPHONE (OUTPATIENT)
Dept: GASTROENTEROLOGY | Facility: CLINIC | Age: 69
End: 2024-06-12
Payer: MEDICARE

## 2024-06-12 NOTE — TELEPHONE ENCOUNTER
Caller: Khai Slater    Relationship: Self    Best call back number: [phone]    Caller requesting test results: RETURNING MISSED CALL FROM [facility] ABOUT COLON POLYPS RESULTS    What test was performed: COLONOSCOPY    When was the test performed: 05/28/24    Where was the test performed:     Additional notes:

## 2024-06-12 NOTE — TELEPHONE ENCOUNTER
----- Message from Shaq Quiñones sent at 6/12/2024  8:13 AM EDT -----  06/12/24       Tell him that pathology from his recent colonoscopy showed that the colon polyps that were removed were not cancerous and not precancerous, which is good.  I recommend that he have a repeat colonoscopy in 5 years.  Please send a copy of this report to his PCP.  Thx. kjh

## 2024-06-28 RX ORDER — CARVEDILOL 6.25 MG/1
6.25 TABLET ORAL 2 TIMES DAILY
Qty: 180 TABLET | Refills: 0 | Status: SHIPPED | OUTPATIENT
Start: 2024-06-28

## 2024-06-28 RX ORDER — CLOPIDOGREL BISULFATE 75 MG/1
75 TABLET ORAL DAILY
Qty: 90 TABLET | Refills: 0 | Status: SHIPPED | OUTPATIENT
Start: 2024-06-28

## 2024-06-28 NOTE — TELEPHONE ENCOUNTER
Caller: Khai Slater KIRT    Relationship: Self    Best call back number: [phone]     Requested Prescriptions:   Requested Prescriptions     Pending Prescriptions Disp Refills    carvedilol (COREG) 6.25 MG tablet 180 tablet 3     Sig: Take 1 tablet by mouth 2 (Two) Times a Day.    clopidogrel (PLAVIX) 75 MG tablet 90 tablet 3     Sig: Take 1 tablet by mouth Daily.        Pharmacy where request should be sent: [facility][id number] [address] AT [facility] [phone] [facility] [phone]      Last office visit with prescribing clinician: 4/29/2024   Last telemedicine visit with prescribing clinician: Visit date not found   Next office visit with prescribing clinician: 3/6/2025     Additional details provided by patient: PATIENT IS OUT OF MEDICINES    Does the patient have less than a 3 day supply:  [x] Yes  [] No    Would you like a call back once the refill request has been completed: [x] Yes [] No    If the office needs to give you a call back, can they leave a voicemail: [x] Yes [] No    Risa Bender Rep   06/28/24 15:07 EDT

## 2024-08-09 RX ORDER — LISINOPRIL 5 MG/1
5 TABLET ORAL DAILY
Qty: 90 TABLET | Refills: 3 | Status: SHIPPED | OUTPATIENT
Start: 2024-08-09

## 2024-08-09 NOTE — TELEPHONE ENCOUNTER
Caller: Khai Slater KIRT    Relationship: Self    Best call back number: [phone]     Requested Prescriptions:   Requested Prescriptions     Pending Prescriptions Disp Refills    lisinopril (PRINIVIL,ZESTRIL) 5 MG tablet 90 tablet 3     Sig: Take 1 tablet by mouth Daily.        Pharmacy where request should be sent: [facility] #[id number] [address] AT [facility] [phone] [facility] [phone]      Last office visit with prescribing clinician: 4/29/2024   Last telemedicine visit with prescribing clinician: Visit date not found   Next office visit with prescribing clinician: 3/6/2025     Does the patient have less than a 3 day supply:  [] Yes  [x] No    Would you like a call back once the refill request has been completed: [] Yes [x] No    If the office needs to give you a call back, can they leave a voicemail: [] Yes [x] No    Risa Davey   08/09/24 12:38 EDT

## 2024-11-19 NOTE — TELEPHONE ENCOUNTER
Caller: Bryon Khai KIRT    Relationship: Self    Best call back number: [phone]     Requested Prescriptions:   Requested Prescriptions     Pending Prescriptions Disp Refills    clopidogrel (PLAVIX) 75 MG tablet 90 tablet 0     Sig: Take 1 tablet by mouth Daily.        Pharmacy where request should be sent: [facility] #[id number] [facility] [address] [facility] AT [facility] [phone] [facility] [phone] FX     Last office visit with prescribing clinician: 4/29/2024   Last telemedicine visit with prescribing clinician: Visit date not found   Next office visit with prescribing clinician: 3/6/2025     Would you like a call back once the refill request has been completed: [x] Yes [] No    If the office needs to give you a call back, can they leave a voicemail: [x] Yes [] No    Risa Pizarro Rep   11/19/24 12:18 EST

## 2024-11-20 RX ORDER — CLOPIDOGREL BISULFATE 75 MG/1
75 TABLET ORAL DAILY
Qty: 90 TABLET | Refills: 0 | Status: SHIPPED | OUTPATIENT
Start: 2024-11-20

## 2024-12-30 DIAGNOSIS — I25.810 CORONARY ARTERY DISEASE INVOLVING CORONARY BYPASS GRAFT OF NATIVE HEART WITHOUT ANGINA PECTORIS: ICD-10-CM

## 2024-12-30 DIAGNOSIS — E78.2 MIXED HYPERLIPIDEMIA: ICD-10-CM

## 2024-12-30 DIAGNOSIS — K21.9 GASTROESOPHAGEAL REFLUX DISEASE, UNSPECIFIED WHETHER ESOPHAGITIS PRESENT: ICD-10-CM

## 2024-12-30 RX ORDER — PANTOPRAZOLE SODIUM 40 MG/1
40 TABLET, DELAYED RELEASE ORAL DAILY
Qty: 90 TABLET | Refills: 3 | Status: SHIPPED | OUTPATIENT
Start: 2024-12-30

## 2024-12-30 RX ORDER — ATORVASTATIN CALCIUM 40 MG/1
60 TABLET, FILM COATED ORAL DAILY
Qty: 135 TABLET | Refills: 3 | Status: SHIPPED | OUTPATIENT
Start: 2024-12-30

## 2024-12-30 NOTE — TELEPHONE ENCOUNTER
Caller: Khai Slater KIRT    Relationship: Self    Best call back number: [phone]     Requested Prescriptions:   Requested Prescriptions     Pending Prescriptions Disp Refills    atorvastatin (LIPITOR) 40 MG tablet 135 tablet 3     Sig: Take 1.5 tablets by mouth Daily.    pantoprazole (PROTONIX) 40 MG EC tablet 90 tablet 3     Sig: Take 1 tablet by mouth Daily.        Pharmacy where request should be sent: [facility] #[id number] [address] AT [facility] [phone] [facility] [phone]      Last office visit with prescribing clinician: 4/29/2024   Last telemedicine visit with prescribing clinician: Visit date not found   Next office visit with prescribing clinician: 3/6/2025     Additional details provided by patient:     Does the patient have less than a 3 day supply:  [] Yes  [x] No    Would you like a call back once the refill request has been completed: [] Yes [x] No    If the office needs to give you a call back, can they leave a voicemail: [] Yes [x] No    Risa Ewing Rep   12/30/24 14:05 EST

## 2025-02-07 ENCOUNTER — OFFICE VISIT (OUTPATIENT)
Dept: CARDIOLOGY | Facility: CLINIC | Age: 70
End: 2025-02-07
Payer: MEDICARE

## 2025-02-07 VITALS
DIASTOLIC BLOOD PRESSURE: 70 MMHG | WEIGHT: 188 LBS | SYSTOLIC BLOOD PRESSURE: 128 MMHG | HEIGHT: 69 IN | HEART RATE: 59 BPM | BODY MASS INDEX: 27.85 KG/M2

## 2025-02-07 DIAGNOSIS — Z95.1 HX OF CABG: Primary | ICD-10-CM

## 2025-02-07 DIAGNOSIS — I10 PRIMARY HYPERTENSION: ICD-10-CM

## 2025-02-07 DIAGNOSIS — E78.2 MIXED HYPERLIPIDEMIA: ICD-10-CM

## 2025-02-07 NOTE — PROGRESS NOTES
Date of Office Visit: 25  Encounter Provider: Torsten Law MD  Place of Service: [facility] CARDIOLOGY  Patient Name: Khai Slater  :1955  [id number]    Chief Complaint   Patient presents with    Coronary Artery Disease   :     HPI: Khai Slater is a 69 y.o. male  He had a non-STEMI in .  To his distal circ he had a Taxus stent put in.   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  He had a repeat cath and his circ had restenosed, and they felt he had a new lesion in his LAD.  He had Cypher stents at that time.  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  They felt that those had all re-stenosed and so he underwent a bypass.  He had a LIMA to the LAD, and a vein to the circ.  The RCA has always been okay.   In , recathed, no symptoms.   The LIMA they felt was somewhat smallish and may have a lesion at the insertion site, the vein to the circumflex was occluded, the RCA had some 20% disease in it.  We have managed him medically since then.  In  he had a stress test that was normal.  Honestly, when I have looked at his films I feel like that DAMEON to the LAD is probably okay and does not have significant disease in it.  I think that he has a small area of his distal circ that is a problem, but that does not even show up on his stress.  His right coronaries are okay, and his LAD is okay.    He is here for follow-up and has been doing well.  He has not had any chest pain shortness of breath no PND orthopnea edema he does a fair amount a low level exercise but not a lot of intense activity he is recently made some dietary changes and is lost some weight.  His last LDL was 79 his HDL was 49 his A1c was 5.9.    Past Medical History:   Diagnosis Date    Acute non-ST-elevation myocardial infarction 3/4/2016    Description:  tx with Taxus stent to dis circ    CAD (coronary artery disease)     Cancer      testicular    Coronary arteriosclerosis in native artery 3/4/2016    Description: due to FAA cath in 1/08 restenosis in circ and new lesion in LAD tx with cypher stents; 9/12 no sx but due to FAA relooked and CABG LIMA to LAD and SVG to circ by Dr Goode; 5/13 recath no sx and LIMA atretic and SVG to circ gone    GERD (gastroesophageal reflux disease)     Hyperlipidemia     Hypertension     NSTEMI (non-ST elevated myocardial infarction)        Past Surgical History:   Procedure Laterality Date    CARDIAC CATHETERIZATION      COLONOSCOPY      COLONOSCOPY N/A 3/24/2021    Procedure: COLONOSCOPY INTO CECUM AND T.I WITH SALINE LIFT COLD SNARE POLYPECTOMY  WITH CLIP PLACEMENT X1 IN CECUM AND COLD BIOPSY POLYPECTOMIES;  Surgeon: Shaq Quiñones MD;  Location: [facility] ENDOSCOPY;  Service: Gastroenterology;  Laterality: N/A;  PRE- HISTORY OF COLON POLYPS, PERSONAL HISTORY OF COLON POLYPS  POST- POLYPS, INTERNAL HEMORRHOIDS    COLONOSCOPY N/A 5/28/2024    Procedure: COLONOSCOPY TO CECUM & T.I. WITH COLD POLYPECTOMIES;  Surgeon: Shaq Quiñones MD;  Location: [facility] ENDOSCOPY;  Service: Gastroenterology;  Laterality: N/A;  PRE- HISTORY OF COLON POLYPS  POST- COLON POLYPS, HEMORRHOIDS    CORONARY ANGIOPLASTY WITH STENT PLACEMENT      5/2/2013; 70% stenosis in the LIMA to the LAD, % occluded, proximal LAD has 90% in stent stenosis, 100% distal left circumflex, 20% diffuse disease of the RCA.    CORONARY ARTERY BYPASS GRAFT      ENDOSCOPY N/A 3/24/2021    Procedure: ESOPHAGOGASTRODUODENOSCOPY WITH COLD BIOPSIES;  Surgeon: Shaq Quiñones MD;  Location: [facility] ENDOSCOPY;  Service: Gastroenterology;  Laterality: N/A;  PRE- DYSPEPSIA  POST- ESOPHAGITIS, GASTRITIS    ORCHIECTOMY Left        Social History     Socioeconomic History    Marital status:    Tobacco Use    Smoking status: Never    Smokeless tobacco: Never    Tobacco comments:     CAFFEINE USE   Vaping Use    Vaping status: Never Used   Substance and Sexual Activity     Alcohol use: Yes     Alcohol/week: 1.0 standard drink of alcohol     Types: 1 Cans of beer per week     Comment: occassionally    Drug use: No    Sexual activity: Defer       Family History   Problem Relation Age of Onset    Heart disease Mother     Heart attack Mother     Hypertension Father     No Known Problems Maternal Grandmother     Diabetes Maternal Grandfather     No Known Problems Paternal Grandmother     No Known Problems Paternal Grandfather        Review of Systems   Constitutional: Negative for decreased appetite, fever, malaise/fatigue and weight loss.   HENT:  Negative for nosebleeds.    Eyes:  Negative for double vision.   Cardiovascular:  Negative for chest pain, claudication, cyanosis, dyspnea on exertion, irregular heartbeat, leg swelling, near-syncope, orthopnea, palpitations, paroxysmal nocturnal dyspnea and syncope.   Respiratory:  Negative for cough, hemoptysis and shortness of breath.    Hematologic/Lymphatic: Negative for bleeding problem.   Skin:  Negative for rash.   Musculoskeletal:  Negative for falls and myalgias.   Gastrointestinal:  Negative for hematochezia, jaundice, melena, nausea and vomiting.   Genitourinary:  Negative for hematuria.   Neurological:  Negative for dizziness and seizures.   Psychiatric/Behavioral:  Negative for altered mental status and memory loss.        No Known Allergies      Current Outpatient Medications:     atorvastatin (LIPITOR) 40 MG tablet, Take 1.5 tablets by mouth Daily., Disp: 135 tablet, Rfl: 3    carvedilol (COREG) 6.25 MG tablet, Take 1 tablet by mouth 2 (Two) Times a Day., Disp: 180 tablet, Rfl: 0    clopidogrel (PLAVIX) 75 MG tablet, Take 1 tablet by mouth Daily., Disp: 90 tablet, Rfl: 0    Coenzyme Q10 (CO Q 10 PO), Take 1 tablet by mouth Daily., Disp: , Rfl:     lisinopril (PRINIVIL,ZESTRIL) 5 MG tablet, Take 1 tablet by mouth Daily., Disp: 90 tablet, Rfl: 3    nitroglycerin (NITROSTAT) 0.4 MG SL tablet, 1 under the tongue as needed for  "angina, may repeat q5mins for up three doses call 911 if not resolved, Disp: 25 tablet, Rfl: 3    pantoprazole (PROTONIX) 40 MG EC tablet, Take 1 tablet by mouth Daily., Disp: 90 tablet, Rfl: 3      Objective:     Vitals:    02/07/25 1350   BP: 128/70   Pulse: 59   Weight: 85.3 kg (188 lb)   Height: 175.3 cm (69\")     Body mass index is 27.76 kg/m².    Constitutional:       Appearance: Well-developed.   Eyes:      General: No scleral icterus.  HENT:      Head: Normocephalic.   Neck:      Thyroid: No thyromegaly.      Vascular: No JVD.      Lymphadenopathy: No cervical adenopathy.   Pulmonary:      Effort: Pulmonary effort is normal.      Breath sounds: Normal breath sounds. No wheezing. No rales.   Cardiovascular:      Normal rate. Regular rhythm.      No gallop.    Edema:     Peripheral edema absent.   Abdominal:      Palpations: Abdomen is soft.      Tenderness: There is no abdominal tenderness.   Musculoskeletal: Normal range of motion. Skin:     General: Skin is warm and dry.      Findings: No rash.   Neurological:      Mental Status: Alert and oriented to person, place, and time.           ECG 12 Lead    Date/Time: 2/7/2025 2:24 PM  Performed by: Torsten Law MD    Authorized by: Torsten Law MD  Comparison: compared with previous ECG   Similar to previous ECG  Rhythm: sinus rhythm    Clinical impression: normal ECG           Assessment:       Diagnosis Plan   1. Hx of CABG        2. Mixed hyperlipidemia        3. Primary hypertension               Plan:       I think he is doing well I mean he is asymptomatic he has done great for a long period of time I am not can to make a lot of change but I do want his LDL under 55 and so I am going to increase his atorvastatin to 80 a day I am going to have him come back and get his lipids checked after [place] and I am going to check a LP(a) and an apolipoprotein B at that time.  Will see him back in a year    No follow-ups on file.     As always, it has been a " pleasure to participate in your patient's care.      Sincerely,       Torsten Law MD

## 2025-03-10 ENCOUNTER — TELEPHONE (OUTPATIENT)
Dept: PEDIATRICS | Facility: OTHER | Age: 70
End: 2025-03-10
Payer: MEDICARE

## 2025-03-10 DIAGNOSIS — I25.810 CORONARY ARTERY DISEASE INVOLVING CORONARY BYPASS GRAFT OF NATIVE HEART WITHOUT ANGINA PECTORIS: Primary | ICD-10-CM

## 2025-03-10 RX ORDER — CLOPIDOGREL BISULFATE 75 MG/1
75 TABLET ORAL DAILY
Qty: 90 TABLET | Refills: 4 | Status: SHIPPED | OUTPATIENT
Start: 2025-03-10

## 2025-03-10 NOTE — TELEPHONE ENCOUNTER
Caller: Khai Slater KIRT    Relationship: Self    Best call back number: [phone]     Requested Prescriptions:   Requested Prescriptions     Pending Prescriptions Disp Refills    clopidogrel (PLAVIX) 75 MG tablet 90 tablet 0     Sig: Take 1 tablet by mouth Daily.        Pharmacy where request should be sent: [facility] #[id number] [facility] [address] [facility] AT [facility] - [phone] [facility] [phone] FX     Last office visit with prescribing clinician: 4/29/2024   Last telemedicine visit with prescribing clinician: Visit date not found   Next office visit with prescribing clinician: Visit date not found     Additional details provided by patient:      Does the patient have less than a 3 day supply:  [] Yes  [] No    Would you like a call back once the refill request has been completed: [] Yes [] No    If the office needs to give you a call back, can they leave a voicemail: [] Yes [] No    Risa Das   03/10/25 12:19 EDT

## 2025-03-14 ENCOUNTER — OFFICE VISIT (OUTPATIENT)
Dept: FAMILY MEDICINE CLINIC | Facility: CLINIC | Age: 70
End: 2025-03-14
Payer: MEDICARE

## 2025-03-14 VITALS
OXYGEN SATURATION: 94 % | WEIGHT: 190.4 LBS | HEART RATE: 56 BPM | SYSTOLIC BLOOD PRESSURE: 108 MMHG | DIASTOLIC BLOOD PRESSURE: 80 MMHG | TEMPERATURE: 97.3 F | HEIGHT: 69 IN | BODY MASS INDEX: 28.2 KG/M2

## 2025-03-14 DIAGNOSIS — E78.2 MIXED HYPERLIPIDEMIA: ICD-10-CM

## 2025-03-14 DIAGNOSIS — I25.810 CORONARY ARTERY DISEASE INVOLVING CORONARY BYPASS GRAFT OF NATIVE HEART WITHOUT ANGINA PECTORIS: Primary | ICD-10-CM

## 2025-03-14 DIAGNOSIS — R73.03 PREDIABETES: ICD-10-CM

## 2025-03-14 DIAGNOSIS — Z00.00 HEALTHCARE MAINTENANCE: ICD-10-CM

## 2025-03-14 DIAGNOSIS — I10 PRIMARY HYPERTENSION: ICD-10-CM

## 2025-03-14 DIAGNOSIS — K21.00 GASTROESOPHAGEAL REFLUX DISEASE WITH ESOPHAGITIS WITHOUT HEMORRHAGE: ICD-10-CM

## 2025-03-14 RX ORDER — TADALAFIL 5 MG/1
1 TABLET ORAL DAILY
COMMUNITY
Start: 2025-02-06

## 2025-03-14 NOTE — PROGRESS NOTES
Chief Complaint  Establish Care (Pt is establishing care, pt is fasting, no complaints/)    Subjective        Khai Slater presents to [facility]  History of Present Illness    Patient here today to establish care.  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 Has 3 children and 11 grandchildren  Only smoked for a few years, in 1976    PAST HISTORY    #CAD, H/o NSTEMI s/p CABG  NSTEMI in 2007 s/p stent, had 2 x repeat caths in 2008 and in 2012, at which point he was determined to need CABG  Follows with cardiology, most recent visit 2/7, further increased his atorvastatin to 80 mg q. day due to most recent LDL 79 (3/4/2024)  Repeat lipid panel ordered by cardiologist, recommended to be done in May after [place]  Progress Notes by Torsten Law MD (02/07/2025 13:20)   On Plavix 75mg qd + Carvedilol 6.2mg BID + Atorvastatin 80mg qd + Lisinopril 5mg qd    #HTN  On carvedilol 6.25 mg bid plus lisinopril 5 mg q. day  Patient reports he never checks BP at home, does have a home cuff   BP always normal at doctor's offices  Believes had [place] at one point for enlargement of ventricle    #Testicular cancer s/p L orchiectomy, May 2021  #Peyronie's disease  sees urology (Dr. Dru Castillo) once/yr. Had PSA done at most recent visit on 2/5/25 and told normal    #GERD  Says his GERD is dietary-dependent, carbs and a bad wine are triggers  Takes pantoprazole 40 mg q. day  EGD done 03/2021 demonstrated grade a esophagitis without bleeding and erythematous mucosa in the stomach  EGD biopsies were negative for H. 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 He had a liver bx at that time and was told no abnormalities other than some hepatic steatosis     #Healthcare maintenance  Last colonoscopy 05/28/2024 remarkable for 4 x sessile polyps and internal hemorrhoids  Biopsy of polyps removed 05/2024 demonstrated hyperplastic  Recommended repeat colonoscopy in 5 years, 05/2029        Objective   Vital Signs:  /80   Pulse 56   Temp 97.3 °F (36.3 °C)   Ht 175.3 cm (69\")   Wt 86.4 kg (190 lb 6.4 oz)   SpO2 94%   BMI 28.12 kg/m²   Estimated body mass index is 28.12 kg/m² as calculated from the following:    Height as of this encounter: 175.3 cm (69\").    Weight as of this encounter: 86.4 kg (190 lb 6.4 oz).          Physical Exam  Constitutional:       General: He is not in acute distress.     Appearance: Normal appearance. He is not ill-appearing.   HENT:      Head: Normocephalic and atraumatic.   Eyes:      Extraocular Movements: Extraocular movements intact.   Cardiovascular:      Rate and Rhythm: Normal rate and regular rhythm.      Heart sounds: Normal heart sounds. No murmur heard.  Pulmonary:      Effort: Pulmonary effort is normal.   Neurological:      Mental Status: He is alert.        Result Review :                Assessment and Plan   Diagnoses and all orders for this visit:    1. Coronary artery disease involving coronary bypass graft of " native heart without angina pectoris (Primary)  2. Mixed hyperlipidemia  -Patient follows with cardiology annually, next cardiology visit 02/2026  -He will be getting repeat lipid panel in May (ordered by his cardiologist) to reassess LDL after increasing atorvastatin to 80 mg q. day  -Will do remaining lab studies today  -Continue Plavix 75mg qd + Carvedilol 6.2mg BID + Atorvastatin 80mg qd + Lisinopril 5mg qd  -     Comprehensive Metabolic Panel  -     atorvastatin (LIPITOR) 40 MG tablet; Take 2 tablets by mouth Daily.  Dispense: 135 tablet; Refill: 3    3. Primary hypertension  -Appears well-controlled on carvedilol 6.25 mg bid + lisinopril 5 mg q. Day. though patient does not monitor BP at home, his readings at healthcare encounters are consistently within goal    -     CBC & Differential  -     Comprehensive Metabolic Panel  -     TSH Rfx On Abnormal To Free T4  -     Microalbumin / Creatinine Urine Ratio - Urine, Clean Catch    4. Gastroesophageal reflux disease with esophagitis without hemorrhage  -Most recent EGD and biopsy reports reviewed demonstrating inflammatory changes of esophagitis and gastritis without bleeding and negative for H. pylori  -Patient endorses symptoms well-controlled on pantoprazole 40 mg q. day plus avoiding dietary triggers    5. Prediabetes  -     Comprehensive Metabolic Panel  -     Hemoglobin A1c    6. Healthcare maintenance  -Next colonoscopy due 05/2029  -He gets annual PSAs through his urologist         Follow Up   Return in about 1 year (around 3/14/2026) for Medicare Wellness.  Patient was given instructions and counseling regarding his condition or for health maintenance advice. Please see specific information pulled into the AVS if appropriate.

## 2025-03-15 LAB
ALBUMIN SERPL-MCNC: 4.5 G/DL (ref 3.5–5.2)
ALBUMIN/CREAT UR: 3 MG/G CREAT (ref 0–29)
ALBUMIN/GLOB SERPL: 1.8 G/DL
ALP SERPL-CCNC: 106 U/L (ref 39–117)
ALT SERPL-CCNC: 76 U/L (ref 1–41)
AST SERPL-CCNC: 52 U/L (ref 1–40)
BASOPHILS # BLD AUTO: 0.09 10*3/MM3 (ref 0–0.2)
BASOPHILS NFR BLD AUTO: 1.6 % (ref 0–1.5)
BILIRUB SERPL-MCNC: 0.8 MG/DL (ref 0–1.2)
BUN SERPL-MCNC: 22 MG/DL (ref 8–23)
BUN/CREAT SERPL: 22.7 (ref 7–25)
CALCIUM SERPL-MCNC: 9.6 MG/DL (ref 8.6–10.5)
CHLORIDE SERPL-SCNC: 103 MMOL/L (ref 98–107)
CO2 SERPL-SCNC: 28.4 MMOL/L (ref 22–29)
CREAT SERPL-MCNC: 0.97 MG/DL (ref 0.76–1.27)
CREAT UR-MCNC: 112.1 MG/DL
EGFRCR SERPLBLD CKD-EPI 2021: 84.5 ML/MIN/1.73
EOSINOPHIL # BLD AUTO: 0.6 10*3/MM3 (ref 0–0.4)
EOSINOPHIL NFR BLD AUTO: 10.7 % (ref 0.3–6.2)
ERYTHROCYTE [DISTWIDTH] IN BLOOD BY AUTOMATED COUNT: 12.2 % (ref 12.3–15.4)
GLOBULIN SER CALC-MCNC: 2.5 GM/DL
GLUCOSE SERPL-MCNC: 110 MG/DL (ref 65–99)
HBA1C MFR BLD: 5.7 % (ref 4.8–5.6)
HCT VFR BLD AUTO: 49.1 % (ref 37.5–51)
HGB BLD-MCNC: 16.3 G/DL (ref 13–17.7)
IMM GRANULOCYTES # BLD AUTO: 0.02 10*3/MM3 (ref 0–0.05)
IMM GRANULOCYTES NFR BLD AUTO: 0.4 % (ref 0–0.5)
LYMPHOCYTES # BLD AUTO: 1.77 10*3/MM3 (ref 0.7–3.1)
LYMPHOCYTES NFR BLD AUTO: 31.6 % (ref 19.6–45.3)
MCH RBC QN AUTO: 30.9 PG (ref 26.6–33)
MCHC RBC AUTO-ENTMCNC: 33.2 G/DL (ref 31.5–35.7)
MCV RBC AUTO: 93.2 FL (ref 79–97)
MICROALBUMIN UR-MCNC: 3.1 UG/ML
MONOCYTES # BLD AUTO: 0.58 10*3/MM3 (ref 0.1–0.9)
MONOCYTES NFR BLD AUTO: 10.4 % (ref 5–12)
NEUTROPHILS # BLD AUTO: 2.54 10*3/MM3 (ref 1.7–7)
NEUTROPHILS NFR BLD AUTO: 45.3 % (ref 42.7–76)
NRBC BLD AUTO-RTO: 0 /100 WBC (ref 0–0.2)
PLATELET # BLD AUTO: 214 10*3/MM3 (ref 140–450)
POTASSIUM SERPL-SCNC: 5.4 MMOL/L (ref 3.5–5.2)
PROT SERPL-MCNC: 7 G/DL (ref 6–8.5)
RBC # BLD AUTO: 5.27 10*6/MM3 (ref 4.14–5.8)
SODIUM SERPL-SCNC: 143 MMOL/L (ref 136–145)
TSH SERPL DL<=0.005 MIU/L-ACNC: 4.4 UIU/ML (ref 0.27–4.2)
WBC # BLD AUTO: 5.6 10*3/MM3 (ref 3.4–10.8)

## 2025-03-15 RX ORDER — ATORVASTATIN CALCIUM 40 MG/1
80 TABLET, FILM COATED ORAL DAILY
Qty: 135 TABLET | Refills: 3 | Status: SHIPPED | OUTPATIENT
Start: 2025-03-15

## 2025-03-16 PROBLEM — K21.00 GASTROESOPHAGEAL REFLUX DISEASE WITH ESOPHAGITIS WITHOUT HEMORRHAGE: Status: ACTIVE | Noted: 2025-03-16

## 2025-03-20 LAB
T4 FREE SERPL-MCNC: 1.1 NG/DL (ref 0.92–1.68)
WRITTEN AUTHORIZATION: NORMAL

## 2025-03-26 DIAGNOSIS — R74.01 TRANSAMINITIS: ICD-10-CM

## 2025-03-26 DIAGNOSIS — E87.5 HYPERKALEMIA: Primary | ICD-10-CM

## 2025-03-26 DIAGNOSIS — Z11.59 ENCOUNTER FOR SCREENING FOR OTHER VIRAL DISEASES: ICD-10-CM

## 2025-04-03 ENCOUNTER — TELEPHONE (OUTPATIENT)
Dept: FAMILY MEDICINE CLINIC | Facility: CLINIC | Age: 70
End: 2025-04-03
Payer: MEDICARE

## 2025-06-05 DIAGNOSIS — I25.810 CORONARY ARTERY DISEASE INVOLVING CORONARY BYPASS GRAFT OF NATIVE HEART WITHOUT ANGINA PECTORIS: ICD-10-CM

## 2025-06-05 DIAGNOSIS — E78.2 MIXED HYPERLIPIDEMIA: ICD-10-CM

## 2025-06-05 NOTE — TELEPHONE ENCOUNTER
Caller: Khai Slater KIRT    Relationship: Self    Best call back number: [phone]     Requested Prescriptions:   Requested Prescriptions     Pending Prescriptions Disp Refills    clopidogrel (PLAVIX) 75 MG tablet 90 tablet 4     Sig: Take 1 tablet by mouth Daily.    atorvastatin (LIPITOR) 40 MG tablet 135 tablet 3     Sig: Take 2 tablets by mouth Daily.        Pharmacy where request should be sent: [facility] [id number] [address] AT [facility] [phone] [facility] [phone]      Last office visit with prescribing clinician: 3/14/2025   Last telemedicine visit with prescribing clinician: Visit date not found   Next office visit with prescribing clinician: 3/20/2026     Additional details provided by patient: PATIENT IS OUT OF HIS LIPITOR    Does the patient have less than a 3 day supply:  [x] Yes  [] No    Would you like a call back once the refill request has been completed: [] Yes [x] No    If the office needs to give you a call back, can they leave a voicemail: [] Yes [x] No    Risa Mcgee Rep   06/05/25 08:42 EDT     
Rx Refill Note  Requested Prescriptions     Pending Prescriptions Disp Refills    clopidogrel (PLAVIX) 75 MG tablet 90 tablet 4     Sig: Take 1 tablet by mouth Daily.    atorvastatin (LIPITOR) 40 MG tablet 135 tablet 3     Sig: Take 2 tablets by mouth Daily.      Last office visit with prescribing clinician: 3/14/2025   Last telemedicine visit with prescribing clinician: Visit date not found   Next office visit with prescribing clinician: 3/20/2026                         Would you like a call back once the refill request has been completed: [] Yes [] No    If the office needs to give you a call back, can they leave a voicemail: [] Yes [] No    Dennis Sotelo CMA/LMR  06/05/25, 08:45 EDT    
- - -

## 2025-06-06 RX ORDER — ATORVASTATIN CALCIUM 40 MG/1
80 TABLET, FILM COATED ORAL DAILY
Qty: 180 TABLET | Refills: 3 | Status: SHIPPED | OUTPATIENT
Start: 2025-06-06

## 2025-06-06 RX ORDER — CLOPIDOGREL BISULFATE 75 MG/1
75 TABLET ORAL DAILY
Qty: 90 TABLET | Refills: 3 | Status: SHIPPED | OUTPATIENT
Start: 2025-06-06

## 2025-08-06 DIAGNOSIS — K21.9 GASTROESOPHAGEAL REFLUX DISEASE, UNSPECIFIED WHETHER ESOPHAGITIS PRESENT: ICD-10-CM

## 2025-08-06 RX ORDER — LISINOPRIL 5 MG/1
5 TABLET ORAL DAILY
Qty: 90 TABLET | Refills: 3 | Status: SHIPPED | OUTPATIENT
Start: 2025-08-06

## 2025-08-06 RX ORDER — PANTOPRAZOLE SODIUM 40 MG/1
40 TABLET, DELAYED RELEASE ORAL DAILY
Qty: 90 TABLET | Refills: 3 | Status: SHIPPED | OUTPATIENT
Start: 2025-08-06

## 2025-08-14 ENCOUNTER — TELEPHONE (OUTPATIENT)
Dept: FAMILY MEDICINE CLINIC | Facility: CLINIC | Age: 70
End: 2025-08-14
Payer: MEDICARE

## (undated) DEVICE — SENSR O2 OXIMAX FNGR A/ 18IN NONSTR

## (undated) DEVICE — TUBING, SUCTION, 1/4" X 10', STRAIGHT: Brand: MEDLINE

## (undated) DEVICE — LN SMPL CO2 SHTRM SD STREAM W/M LUER

## (undated) DEVICE — ADAPT CLN BIOGUARD AIR/H2O DISP

## (undated) DEVICE — KT ORCA ORCAPOD DISP STRL

## (undated) DEVICE — CANN O2 ETCO2 FITS ALL CONN CO2 SMPL A/ 7IN DISP LF

## (undated) DEVICE — THE TORRENT IRRIGATION SCOPE CONNECTOR IS USED WITH THE TORRENT IRRIGATION TUBING TO PROVIDE IRRIGATION FLUIDS SUCH AS STERILE WATER DURING GASTROINTESTINAL ENDOSCOPIC PROCEDURES WHEN USED IN CONJUNCTION WITH AN IRRIGATION PUMP (OR ELECTROSURGICAL UNIT).: Brand: TORRENT

## (undated) DEVICE — SINGLE-USE BIOPSY FORCEPS: Brand: RADIAL JAW 4

## (undated) DEVICE — BITEBLOCK OMNI BLOC

## (undated) DEVICE — FRCP BX RADJAW4 NDL 2.8 240CM LG OG BX40